# Patient Record
Sex: MALE | Race: WHITE | NOT HISPANIC OR LATINO | Employment: OTHER | URBAN - NONMETROPOLITAN AREA
[De-identification: names, ages, dates, MRNs, and addresses within clinical notes are randomized per-mention and may not be internally consistent; named-entity substitution may affect disease eponyms.]

---

## 2019-11-26 ENCOUNTER — HOSPITAL ENCOUNTER (INPATIENT)
Facility: HOSPITAL | Age: 64
LOS: 5 days | Discharge: HOME | End: 2019-12-01
Attending: INTERNAL MEDICINE | Admitting: FAMILY MEDICINE

## 2019-11-26 DIAGNOSIS — R65.21 SHOCK, SEPTIC (HCC): Primary | ICD-10-CM

## 2019-11-26 DIAGNOSIS — A41.9 SHOCK, SEPTIC (HCC): Primary | ICD-10-CM

## 2019-11-26 PROBLEM — R65.20 SEVERE SEPSIS (HCC): Status: ACTIVE | Noted: 2019-11-26

## 2019-11-26 LAB
ALBUMIN SERPL-MCNC: 2.8 G/DL (ref 3.5–5.2)
ALBUMIN/GLOB SERPL: 1.2 G/DL
ALP SERPL-CCNC: 33 U/L (ref 39–117)
ALT SERPL W P-5'-P-CCNC: <5 U/L (ref 1–41)
AMPHET+METHAMPHET UR QL: NEGATIVE
AMPHETAMINES UR QL: NEGATIVE
ANION GAP SERPL CALCULATED.3IONS-SCNC: 10 MMOL/L (ref 5–15)
APTT PPP: 38.1 SECONDS (ref 24.1–35)
ARTERIAL PATENCY WRIST A: POSITIVE
AST SERPL-CCNC: 18 U/L (ref 1–40)
ATMOSPHERIC PRESS: 741 MMHG
BARBITURATES UR QL SCN: NEGATIVE
BASE EXCESS BLDA CALC-SCNC: -5.7 MMOL/L (ref 0–2)
BDY SITE: ABNORMAL
BENZODIAZ UR QL SCN: NEGATIVE
BILIRUB SERPL-MCNC: 0.2 MG/DL (ref 0.2–1.2)
BODY TEMPERATURE: 37 C
BUN BLD-MCNC: 41 MG/DL (ref 8–23)
BUN/CREAT SERPL: 19.3 (ref 7–25)
BUPRENORPHINE SERPL-MCNC: NEGATIVE NG/ML
CALCIUM SPEC-SCNC: 7.2 MG/DL (ref 8.6–10.5)
CANNABINOIDS SERPL QL: NEGATIVE
CHLORIDE SERPL-SCNC: 105 MMOL/L (ref 98–107)
CO2 SERPL-SCNC: 20 MMOL/L (ref 22–29)
COCAINE UR QL: NEGATIVE
CREAT BLD-MCNC: 2.12 MG/DL (ref 0.76–1.27)
D-LACTATE SERPL-SCNC: 1.4 MMOL/L (ref 0.5–2)
FLUAV AG NPH QL: NEGATIVE
FLUBV AG NPH QL IA: NEGATIVE
GAS FLOW AIRWAY: 3 LPM
GFR SERPL CREATININE-BSD FRML MDRD: 32 ML/MIN/1.73
GLOBULIN UR ELPH-MCNC: 2.3 GM/DL
GLUCOSE BLD-MCNC: 139 MG/DL (ref 65–99)
HCO3 BLDA-SCNC: 18.1 MMOL/L (ref 20–26)
INR PPP: 1.2 (ref 0.91–1.09)
Lab: ABNORMAL
MAGNESIUM SERPL-MCNC: 1.8 MG/DL (ref 1.6–2.4)
METHADONE UR QL SCN: NEGATIVE
MODALITY: ABNORMAL
OPIATES UR QL: NEGATIVE
OXYCODONE UR QL SCN: NEGATIVE
PCO2 BLDA: 29.7 MM HG (ref 35–45)
PCP UR QL SCN: NEGATIVE
PH BLDA: 7.39 PH UNITS (ref 7.35–7.45)
PO2 BLDA: 70.4 MM HG (ref 83–108)
POTASSIUM BLD-SCNC: 4.1 MMOL/L (ref 3.5–5.2)
PROPOXYPH UR QL: NEGATIVE
PROT SERPL-MCNC: 5.1 G/DL (ref 6–8.5)
PROTHROMBIN TIME: 15.6 SECONDS (ref 11.9–14.6)
SAO2 % BLDCOA: 95 % (ref 94–99)
SODIUM BLD-SCNC: 135 MMOL/L (ref 136–145)
SODIUM UR-SCNC: <20 MMOL/L
TRICYCLICS UR QL SCN: NEGATIVE
TSH SERPL DL<=0.05 MIU/L-ACNC: 0.3 UIU/ML (ref 0.27–4.2)
VENTILATOR MODE: ABNORMAL

## 2019-11-26 PROCEDURE — 87040 BLOOD CULTURE FOR BACTERIA: CPT | Performed by: INTERNAL MEDICINE

## 2019-11-26 PROCEDURE — 87804 INFLUENZA ASSAY W/OPTIC: CPT | Performed by: INTERNAL MEDICINE

## 2019-11-26 PROCEDURE — 36600 WITHDRAWAL OF ARTERIAL BLOOD: CPT

## 2019-11-26 PROCEDURE — 93005 ELECTROCARDIOGRAM TRACING: CPT | Performed by: INTERNAL MEDICINE

## 2019-11-26 PROCEDURE — 87798 DETECT AGENT NOS DNA AMP: CPT | Performed by: INTERNAL MEDICINE

## 2019-11-26 PROCEDURE — 80053 COMPREHEN METABOLIC PANEL: CPT | Performed by: INTERNAL MEDICINE

## 2019-11-26 PROCEDURE — 83735 ASSAY OF MAGNESIUM: CPT | Performed by: INTERNAL MEDICINE

## 2019-11-26 PROCEDURE — 85025 COMPLETE CBC W/AUTO DIFF WBC: CPT | Performed by: INTERNAL MEDICINE

## 2019-11-26 PROCEDURE — 84443 ASSAY THYROID STIM HORMONE: CPT | Performed by: INTERNAL MEDICINE

## 2019-11-26 PROCEDURE — 25010000002 MAGNESIUM SULFATE 2 GM/50ML SOLUTION: Performed by: INTERNAL MEDICINE

## 2019-11-26 PROCEDURE — 83605 ASSAY OF LACTIC ACID: CPT | Performed by: INTERNAL MEDICINE

## 2019-11-26 PROCEDURE — 80307 DRUG TEST PRSMV CHEM ANLYZR: CPT | Performed by: INTERNAL MEDICINE

## 2019-11-26 PROCEDURE — 82570 ASSAY OF URINE CREATININE: CPT | Performed by: INTERNAL MEDICINE

## 2019-11-26 PROCEDURE — 94799 UNLISTED PULMONARY SVC/PX: CPT

## 2019-11-26 PROCEDURE — 82803 BLOOD GASES ANY COMBINATION: CPT

## 2019-11-26 PROCEDURE — 25010000002 CEFTRIAXONE PER 250 MG: Performed by: INTERNAL MEDICINE

## 2019-11-26 PROCEDURE — 85730 THROMBOPLASTIN TIME PARTIAL: CPT | Performed by: INTERNAL MEDICINE

## 2019-11-26 PROCEDURE — 84145 PROCALCITONIN (PCT): CPT | Performed by: INTERNAL MEDICINE

## 2019-11-26 PROCEDURE — 93010 ELECTROCARDIOGRAM REPORT: CPT | Performed by: INTERNAL MEDICINE

## 2019-11-26 PROCEDURE — 84300 ASSAY OF URINE SODIUM: CPT | Performed by: INTERNAL MEDICINE

## 2019-11-26 PROCEDURE — 84540 ASSAY OF URINE/UREA-N: CPT | Performed by: INTERNAL MEDICINE

## 2019-11-26 PROCEDURE — 85007 BL SMEAR W/DIFF WBC COUNT: CPT | Performed by: INTERNAL MEDICINE

## 2019-11-26 PROCEDURE — 85610 PROTHROMBIN TIME: CPT | Performed by: INTERNAL MEDICINE

## 2019-11-26 RX ORDER — MAGNESIUM SULFATE HEPTAHYDRATE 40 MG/ML
2 INJECTION, SOLUTION INTRAVENOUS ONCE
Status: COMPLETED | OUTPATIENT
Start: 2019-11-26 | End: 2019-11-26

## 2019-11-26 RX ORDER — SODIUM CHLORIDE, SODIUM LACTATE, POTASSIUM CHLORIDE, CALCIUM CHLORIDE 600; 310; 30; 20 MG/100ML; MG/100ML; MG/100ML; MG/100ML
50 INJECTION, SOLUTION INTRAVENOUS CONTINUOUS
Status: DISCONTINUED | OUTPATIENT
Start: 2019-11-26 | End: 2019-11-30

## 2019-11-26 RX ORDER — SODIUM CHLORIDE 0.9 % (FLUSH) 0.9 %
10 SYRINGE (ML) INJECTION EVERY 12 HOURS SCHEDULED
Status: DISCONTINUED | OUTPATIENT
Start: 2019-11-26 | End: 2019-12-01 | Stop reason: HOSPADM

## 2019-11-26 RX ORDER — HEPARIN SODIUM 5000 [USP'U]/ML
5000 INJECTION, SOLUTION INTRAVENOUS; SUBCUTANEOUS EVERY 8 HOURS SCHEDULED
Status: DISCONTINUED | OUTPATIENT
Start: 2019-11-26 | End: 2019-11-29

## 2019-11-26 RX ORDER — SODIUM CHLORIDE 0.9 % (FLUSH) 0.9 %
10 SYRINGE (ML) INJECTION AS NEEDED
Status: DISCONTINUED | OUTPATIENT
Start: 2019-11-26 | End: 2019-12-01 | Stop reason: HOSPADM

## 2019-11-26 RX ADMIN — SODIUM CHLORIDE, POTASSIUM CHLORIDE, SODIUM LACTATE AND CALCIUM CHLORIDE 100 ML/HR: 600; 310; 30; 20 INJECTION, SOLUTION INTRAVENOUS at 22:42

## 2019-11-26 RX ADMIN — DOXYCYCLINE 100 MG: 100 INJECTION, POWDER, LYOPHILIZED, FOR SOLUTION INTRAVENOUS at 22:59

## 2019-11-26 RX ADMIN — SODIUM CHLORIDE, PRESERVATIVE FREE 10 ML: 5 INJECTION INTRAVENOUS at 22:43

## 2019-11-26 RX ADMIN — CEFTRIAXONE SODIUM 2 G: 2 INJECTION, POWDER, FOR SOLUTION INTRAMUSCULAR; INTRAVENOUS at 22:59

## 2019-11-26 RX ADMIN — MAGNESIUM SULFATE HEPTAHYDRATE 2 G: 40 INJECTION, SOLUTION INTRAVENOUS at 22:44

## 2019-11-27 ENCOUNTER — APPOINTMENT (OUTPATIENT)
Dept: CT IMAGING | Facility: HOSPITAL | Age: 64
End: 2019-11-27

## 2019-11-27 ENCOUNTER — APPOINTMENT (OUTPATIENT)
Dept: GENERAL RADIOLOGY | Facility: HOSPITAL | Age: 64
End: 2019-11-27

## 2019-11-27 ENCOUNTER — APPOINTMENT (OUTPATIENT)
Dept: CARDIOLOGY | Facility: HOSPITAL | Age: 64
End: 2019-11-27

## 2019-11-27 PROBLEM — D72.825 BANDEMIA: Status: ACTIVE | Noted: 2019-11-27

## 2019-11-27 PROBLEM — D72.829 LEUKOCYTOSIS: Status: ACTIVE | Noted: 2019-11-27

## 2019-11-27 PROBLEM — R65.21 SHOCK, SEPTIC (HCC): Status: ACTIVE | Noted: 2019-11-27

## 2019-11-27 PROBLEM — R50.9 FEVER: Status: ACTIVE | Noted: 2019-11-27

## 2019-11-27 PROBLEM — J96.01 ACUTE RESPIRATORY FAILURE WITH HYPOXIA: Status: ACTIVE | Noted: 2019-11-27

## 2019-11-27 PROBLEM — E87.20 METABOLIC ACIDOSIS: Status: ACTIVE | Noted: 2019-11-27

## 2019-11-27 PROBLEM — A41.9 SHOCK, SEPTIC (HCC): Status: ACTIVE | Noted: 2019-11-27

## 2019-11-27 PROBLEM — N17.9 AKI (ACUTE KIDNEY INJURY): Status: ACTIVE | Noted: 2019-11-27

## 2019-11-27 PROBLEM — I48.91 ATRIAL FIBRILLATION WITH RVR (HCC): Status: ACTIVE | Noted: 2019-11-27

## 2019-11-27 LAB
ADV 40+41 DNA STL QL NAA+NON-PROBE: NOT DETECTED
ALBUMIN SERPL-MCNC: 3.4 G/DL (ref 3.5–5.2)
ALBUMIN/GLOB SERPL: 1 G/DL
ALP SERPL-CCNC: 44 U/L (ref 39–117)
ALT SERPL W P-5'-P-CCNC: 6 U/L (ref 1–41)
ANION GAP SERPL CALCULATED.3IONS-SCNC: 10 MMOL/L (ref 5–15)
ARTERIAL PATENCY WRIST A: POSITIVE
AST SERPL-CCNC: 26 U/L (ref 1–40)
ASTRO TYP 1-8 RNA STL QL NAA+NON-PROBE: NOT DETECTED
ATMOSPHERIC PRESS: 740 MMHG
BACTERIA UR QL AUTO: ABNORMAL /HPF
BACTERIA UR QL AUTO: ABNORMAL /HPF
BASE EXCESS BLDA CALC-SCNC: -4.7 MMOL/L (ref 0–2)
BASOPHILS # BLD AUTO: 0.03 10*3/MM3 (ref 0–0.2)
BASOPHILS NFR BLD AUTO: 0.2 % (ref 0–1.5)
BDY SITE: ABNORMAL
BH CV ECHO MEAS - AO MAX PG: 6.3 MMHG
BH CV ECHO MEAS - AO MEAN PG: 3 MMHG
BH CV ECHO MEAS - AO ROOT AREA (BSA CORRECTED): 1.7
BH CV ECHO MEAS - AO ROOT AREA: 10.8 CM^2
BH CV ECHO MEAS - AO ROOT DIAM: 3.7 CM
BH CV ECHO MEAS - AO V2 MAX: 125 CM/SEC
BH CV ECHO MEAS - AO V2 MEAN: 78.2 CM/SEC
BH CV ECHO MEAS - AO V2 VTI: 17 CM
BH CV ECHO MEAS - BSA(HAYCOCK): 2.1 M^2
BH CV ECHO MEAS - BSA: 2.1 M^2
BH CV ECHO MEAS - BZI_BMI: 25.9 KILOGRAMS/M^2
BH CV ECHO MEAS - BZI_METRIC_HEIGHT: 185.4 CM
BH CV ECHO MEAS - BZI_METRIC_WEIGHT: 88.9 KG
BH CV ECHO MEAS - EDV(CUBED): 73.6 ML
BH CV ECHO MEAS - EDV(MOD-SP4): 49.4 ML
BH CV ECHO MEAS - EDV(TEICH): 78.1 ML
BH CV ECHO MEAS - EF(CUBED): 73.5 %
BH CV ECHO MEAS - EF(MOD-SP4): 63.6 %
BH CV ECHO MEAS - EF(TEICH): 65.7 %
BH CV ECHO MEAS - ESV(CUBED): 19.5 ML
BH CV ECHO MEAS - ESV(MOD-SP4): 18 ML
BH CV ECHO MEAS - ESV(TEICH): 26.8 ML
BH CV ECHO MEAS - FS: 35.8 %
BH CV ECHO MEAS - IVS/LVPW: 1.1
BH CV ECHO MEAS - IVSD: 0.87 CM
BH CV ECHO MEAS - LA DIMENSION: 3.5 CM
BH CV ECHO MEAS - LA/AO: 0.95
BH CV ECHO MEAS - LAT PEAK E' VEL: 15.8 CM/SEC
BH CV ECHO MEAS - LV DIASTOLIC VOL/BSA (35-75): 23.2 ML/M^2
BH CV ECHO MEAS - LV MASS(C)D: 108.5 GRAMS
BH CV ECHO MEAS - LV MASS(C)DI: 50.9 GRAMS/M^2
BH CV ECHO MEAS - LV SYSTOLIC VOL/BSA (12-30): 8.4 ML/M^2
BH CV ECHO MEAS - LVIDD: 4.2 CM
BH CV ECHO MEAS - LVIDS: 2.7 CM
BH CV ECHO MEAS - LVLD AP4: 7.4 CM
BH CV ECHO MEAS - LVLS AP4: 6.3 CM
BH CV ECHO MEAS - LVOT AREA (M): 4.2 CM^2
BH CV ECHO MEAS - LVOT AREA: 4.2 CM^2
BH CV ECHO MEAS - LVOT DIAM: 2.3 CM
BH CV ECHO MEAS - LVPWD: 0.82 CM
BH CV ECHO MEAS - MED PEAK E' VEL: 10.3 CM/SEC
BH CV ECHO MEAS - MV DEC TIME: 0.21 SEC
BH CV ECHO MEAS - MV E MAX VEL: 77.7 CM/SEC
BH CV ECHO MEAS - PI END-D VEL: 115 CM/SEC
BH CV ECHO MEAS - RAP SYSTOLE: 5 MMHG
BH CV ECHO MEAS - RVSP: 19.4 MMHG
BH CV ECHO MEAS - SI(AO): 85.9 ML/M^2
BH CV ECHO MEAS - SI(CUBED): 25.4 ML/M^2
BH CV ECHO MEAS - SI(MOD-SP4): 14.7 ML/M^2
BH CV ECHO MEAS - SI(TEICH): 24.1 ML/M^2
BH CV ECHO MEAS - SV(AO): 183.2 ML
BH CV ECHO MEAS - SV(CUBED): 54.1 ML
BH CV ECHO MEAS - SV(MOD-SP4): 31.4 ML
BH CV ECHO MEAS - SV(TEICH): 51.4 ML
BH CV ECHO MEAS - TR MAX VEL: 190 CM/SEC
BH CV ECHO MEASUREMENTS AVERAGE E/E' RATIO: 5.95
BILIRUB SERPL-MCNC: 0.2 MG/DL (ref 0.2–1.2)
BILIRUB UR QL STRIP: NEGATIVE
BILIRUB UR QL STRIP: NEGATIVE
BODY TEMPERATURE: 37 C
BUN BLD-MCNC: 38 MG/DL (ref 8–23)
BUN/CREAT SERPL: 20.2 (ref 7–25)
C CAYETANENSIS DNA STL QL NAA+NON-PROBE: NOT DETECTED
C DIFF TOX GENS STL QL NAA+PROBE: NOT DETECTED
CALCIUM SPEC-SCNC: 8.1 MG/DL (ref 8.6–10.5)
CAMPY SP DNA.DIARRHEA STL QL NAA+PROBE: NOT DETECTED
CHLORIDE SERPL-SCNC: 105 MMOL/L (ref 98–107)
CLARITY UR: ABNORMAL
CLARITY UR: CLEAR
CLUMPED PLATELETS: PRESENT
CO2 SERPL-SCNC: 23 MMOL/L (ref 22–29)
COARSE GRAN CASTS URNS QL MICRO: ABNORMAL /LPF
COLOR UR: ABNORMAL
COLOR UR: YELLOW
CREAT BLD-MCNC: 1.88 MG/DL (ref 0.76–1.27)
CREAT UR-MCNC: 188 MG/DL
CRYPTOSP STL CULT: NOT DETECTED
DEPRECATED RDW RBC AUTO: 43.8 FL (ref 37–54)
DEPRECATED RDW RBC AUTO: 45.3 FL (ref 37–54)
E COLI DNA SPEC QL NAA+PROBE: NOT DETECTED
E HISTOLYT AG STL-ACNC: NOT DETECTED
EAEC PAA PLAS AGGR+AATA ST NAA+NON-PRB: NOT DETECTED
EC STX1 + STX2 GENES STL NAA+PROBE: NOT DETECTED
EOSINOPHIL # BLD AUTO: 0.04 10*3/MM3 (ref 0–0.4)
EOSINOPHIL NFR BLD AUTO: 0.3 % (ref 0.3–6.2)
EPEC EAE GENE STL QL NAA+NON-PROBE: NOT DETECTED
ERYTHROCYTE [DISTWIDTH] IN BLOOD BY AUTOMATED COUNT: 13.3 % (ref 12.3–15.4)
ERYTHROCYTE [DISTWIDTH] IN BLOOD BY AUTOMATED COUNT: 13.4 % (ref 12.3–15.4)
ETEC LTA+ST1A+ST1B TOX ST NAA+NON-PROBE: NOT DETECTED
G LAMBLIA DNA SPEC QL NAA+PROBE: NOT DETECTED
GAS FLOW AIRWAY: 6 LPM
GFR SERPL CREATININE-BSD FRML MDRD: 36 ML/MIN/1.73
GIANT PLATELETS: ABNORMAL
GLOBULIN UR ELPH-MCNC: 3.4 GM/DL
GLUCOSE BLD-MCNC: 143 MG/DL (ref 65–99)
GLUCOSE UR STRIP-MCNC: NEGATIVE MG/DL
GLUCOSE UR STRIP-MCNC: NEGATIVE MG/DL
HCO3 BLDA-SCNC: 19.4 MMOL/L (ref 20–26)
HCT VFR BLD AUTO: 37.4 % (ref 37.5–51)
HCT VFR BLD AUTO: 43.3 % (ref 37.5–51)
HGB BLD-MCNC: 12.7 G/DL (ref 13–17.7)
HGB BLD-MCNC: 14.3 G/DL (ref 13–17.7)
HGB UR QL STRIP.AUTO: ABNORMAL
HGB UR QL STRIP.AUTO: ABNORMAL
HYALINE CASTS UR QL AUTO: ABNORMAL /LPF
HYALINE CASTS UR QL AUTO: ABNORMAL /LPF
KETONES UR QL STRIP: NEGATIVE
KETONES UR QL STRIP: NEGATIVE
LEFT ATRIUM VOLUME INDEX: 25.2 ML/M2
LEFT ATRIUM VOLUME: 53.6 CM3
LEUKOCYTE ESTERASE UR QL STRIP.AUTO: ABNORMAL
LEUKOCYTE ESTERASE UR QL STRIP.AUTO: NEGATIVE
LV EF 2D ECHO EST: 65 %
LYMPHOCYTES # BLD AUTO: 0.42 10*3/MM3 (ref 0.7–3.1)
LYMPHOCYTES # BLD MANUAL: 0.14 10*3/MM3 (ref 0.7–3.1)
LYMPHOCYTES NFR BLD AUTO: 3.1 % (ref 19.6–45.3)
LYMPHOCYTES NFR BLD MANUAL: 1 % (ref 19.6–45.3)
LYMPHOCYTES NFR BLD MANUAL: 2 % (ref 5–12)
Lab: ABNORMAL
MAXIMAL PREDICTED HEART RATE: 157 BPM
MCH RBC QN AUTO: 30 PG (ref 26.6–33)
MCH RBC QN AUTO: 30.3 PG (ref 26.6–33)
MCHC RBC AUTO-ENTMCNC: 33 G/DL (ref 31.5–35.7)
MCHC RBC AUTO-ENTMCNC: 34 G/DL (ref 31.5–35.7)
MCV RBC AUTO: 89.3 FL (ref 79–97)
MCV RBC AUTO: 91 FL (ref 79–97)
METAMYELOCYTES NFR BLD MANUAL: 1 % (ref 0–0)
MODALITY: ABNORMAL
MONOCYTES # BLD AUTO: 0.27 10*3/MM3 (ref 0.1–0.9)
MONOCYTES # BLD AUTO: 0.3 10*3/MM3 (ref 0.1–0.9)
MONOCYTES NFR BLD AUTO: 2.2 % (ref 5–12)
NEUTROPHILS # BLD AUTO: 12.66 10*3/MM3 (ref 1.7–7)
NEUTROPHILS # BLD AUTO: 13.19 10*3/MM3 (ref 1.7–7)
NEUTROPHILS NFR BLD AUTO: 93 % (ref 42.7–76)
NEUTROPHILS NFR BLD MANUAL: 74 % (ref 42.7–76)
NEUTS BAND NFR BLD MANUAL: 22 % (ref 0–5)
NITRITE UR QL STRIP: NEGATIVE
NITRITE UR QL STRIP: NEGATIVE
NOROVIRUS GI+II RNA STL QL NAA+NON-PROBE: NOT DETECTED
P SHIGELLOIDES DNA STL QL NAA+PROBE: NOT DETECTED
PCO2 BLDA: 32.6 MM HG (ref 35–45)
PH BLDA: 7.38 PH UNITS (ref 7.35–7.45)
PH UR STRIP.AUTO: <=5 [PH] (ref 5–8)
PH UR STRIP.AUTO: <=5 [PH] (ref 5–8)
PLATELET # BLD AUTO: 164 10*3/MM3 (ref 140–450)
PLATELET # BLD AUTO: 174 10*3/MM3 (ref 140–450)
PMV BLD AUTO: 10.7 FL (ref 6–12)
PMV BLD AUTO: 10.8 FL (ref 6–12)
PO2 BLDA: 80.3 MM HG (ref 83–108)
POTASSIUM BLD-SCNC: 5.2 MMOL/L (ref 3.5–5.2)
PROCALCITONIN SERPL-MCNC: 26.59 NG/ML (ref 0.1–0.25)
PROT SERPL-MCNC: 6.8 G/DL (ref 6–8.5)
PROT UR QL STRIP: ABNORMAL
PROT UR QL STRIP: ABNORMAL
RBC # BLD AUTO: 4.19 10*6/MM3 (ref 4.14–5.8)
RBC # BLD AUTO: 4.76 10*6/MM3 (ref 4.14–5.8)
RBC # UR: ABNORMAL /HPF
RBC # UR: ABNORMAL /HPF
RBC MORPH BLD: NORMAL
REF LAB TEST METHOD: ABNORMAL
REF LAB TEST METHOD: ABNORMAL
RV RNA STL NAA+PROBE: NOT DETECTED
SALMONELLA DNA SPEC QL NAA+PROBE: NOT DETECTED
SAO2 % BLDCOA: 96.4 % (ref 94–99)
SAPO I+II+IV+V RNA STL QL NAA+NON-PROBE: NOT DETECTED
SHIGELLA SP+EIEC IPAH STL QL NAA+PROBE: NOT DETECTED
SODIUM BLD-SCNC: 138 MMOL/L (ref 136–145)
SP GR UR STRIP: 1.01 (ref 1–1.03)
SP GR UR STRIP: 1.02 (ref 1–1.03)
SQUAMOUS #/AREA URNS HPF: ABNORMAL /HPF
SQUAMOUS #/AREA URNS HPF: ABNORMAL /HPF
STRESS TARGET HR: 133 BPM
TRANS CELLS #/AREA URNS HPF: ABNORMAL /HPF
UROBILINOGEN UR QL STRIP: ABNORMAL
UROBILINOGEN UR QL STRIP: ABNORMAL
UUN 24H UR-MCNC: 1137 MG/DL
V CHOLERAE DNA SPEC QL NAA+PROBE: NOT DETECTED
VENTILATOR MODE: ABNORMAL
VIBRIO DNA SPEC NAA+PROBE: NOT DETECTED
WBC CLUMPS # UR AUTO: ABNORMAL /HPF
WBC MORPH BLD: NORMAL
WBC NRBC COR # BLD: 13.61 10*3/MM3 (ref 3.4–10.8)
WBC NRBC COR # BLD: 13.74 10*3/MM3 (ref 3.4–10.8)
WBC UR QL AUTO: ABNORMAL /HPF
WBC UR QL AUTO: ABNORMAL /HPF
YERSINIA STL CULT: NOT DETECTED

## 2019-11-27 PROCEDURE — 94799 UNLISTED PULMONARY SVC/PX: CPT

## 2019-11-27 PROCEDURE — 85025 COMPLETE CBC W/AUTO DIFF WBC: CPT | Performed by: INTERNAL MEDICINE

## 2019-11-27 PROCEDURE — 81001 URINALYSIS AUTO W/SCOPE: CPT | Performed by: INTERNAL MEDICINE

## 2019-11-27 PROCEDURE — 71045 X-RAY EXAM CHEST 1 VIEW: CPT

## 2019-11-27 PROCEDURE — 36600 WITHDRAWAL OF ARTERIAL BLOOD: CPT

## 2019-11-27 PROCEDURE — 0097U HC BIOFIRE FILMARRAY GI PANEL: CPT | Performed by: INTERNAL MEDICINE

## 2019-11-27 PROCEDURE — 93306 TTE W/DOPPLER COMPLETE: CPT

## 2019-11-27 PROCEDURE — 86757 RICKETTSIA ANTIBODY: CPT | Performed by: INTERNAL MEDICINE

## 2019-11-27 PROCEDURE — 25010000002 CEFTRIAXONE PER 250 MG: Performed by: INTERNAL MEDICINE

## 2019-11-27 PROCEDURE — 71250 CT THORAX DX C-: CPT

## 2019-11-27 PROCEDURE — 05HM33Z INSERTION OF INFUSION DEVICE INTO RIGHT INTERNAL JUGULAR VEIN, PERCUTANEOUS APPROACH: ICD-10-PCS | Performed by: INTERNAL MEDICINE

## 2019-11-27 PROCEDURE — 74176 CT ABD & PELVIS W/O CONTRAST: CPT

## 2019-11-27 PROCEDURE — 93306 TTE W/DOPPLER COMPLETE: CPT | Performed by: INTERNAL MEDICINE

## 2019-11-27 PROCEDURE — 82803 BLOOD GASES ANY COMBINATION: CPT

## 2019-11-27 PROCEDURE — 25010000002 HEPARIN (PORCINE) PER 1000 UNITS: Performed by: INTERNAL MEDICINE

## 2019-11-27 PROCEDURE — 87086 URINE CULTURE/COLONY COUNT: CPT | Performed by: INTERNAL MEDICINE

## 2019-11-27 PROCEDURE — 80053 COMPREHEN METABOLIC PANEL: CPT | Performed by: INTERNAL MEDICINE

## 2019-11-27 RX ORDER — ACETAMINOPHEN 325 MG/1
650 TABLET ORAL EVERY 6 HOURS PRN
Status: DISCONTINUED | OUTPATIENT
Start: 2019-11-27 | End: 2019-12-01 | Stop reason: HOSPADM

## 2019-11-27 RX ORDER — AZITHROMYCIN 250 MG/1
250 TABLET, FILM COATED ORAL DAILY
COMMUNITY
Start: 2019-11-26 | End: 2019-12-01 | Stop reason: HOSPADM

## 2019-11-27 RX ADMIN — HEPARIN SODIUM 5000 UNITS: 5000 INJECTION, SOLUTION INTRAVENOUS; SUBCUTANEOUS at 00:15

## 2019-11-27 RX ADMIN — DOXYCYCLINE 100 MG: 100 INJECTION, POWDER, LYOPHILIZED, FOR SOLUTION INTRAVENOUS at 11:10

## 2019-11-27 RX ADMIN — SODIUM CHLORIDE, POTASSIUM CHLORIDE, SODIUM LACTATE AND CALCIUM CHLORIDE 100 ML/HR: 600; 310; 30; 20 INJECTION, SOLUTION INTRAVENOUS at 13:46

## 2019-11-27 RX ADMIN — SODIUM CHLORIDE, PRESERVATIVE FREE 10 ML: 5 INJECTION INTRAVENOUS at 20:07

## 2019-11-27 RX ADMIN — CEFTRIAXONE SODIUM 2 G: 2 INJECTION, POWDER, FOR SOLUTION INTRAMUSCULAR; INTRAVENOUS at 22:34

## 2019-11-27 RX ADMIN — HEPARIN SODIUM 5000 UNITS: 5000 INJECTION, SOLUTION INTRAVENOUS; SUBCUTANEOUS at 13:45

## 2019-11-27 RX ADMIN — SODIUM CHLORIDE 500 ML: 9 INJECTION, SOLUTION INTRAVENOUS at 23:32

## 2019-11-27 RX ADMIN — DOXYCYCLINE 100 MG: 100 INJECTION, POWDER, LYOPHILIZED, FOR SOLUTION INTRAVENOUS at 22:34

## 2019-11-27 RX ADMIN — HEPARIN SODIUM 5000 UNITS: 5000 INJECTION, SOLUTION INTRAVENOUS; SUBCUTANEOUS at 06:03

## 2019-11-27 RX ADMIN — HEPARIN SODIUM 5000 UNITS: 5000 INJECTION, SOLUTION INTRAVENOUS; SUBCUTANEOUS at 21:34

## 2019-11-28 LAB
ANION GAP SERPL CALCULATED.3IONS-SCNC: 6 MMOL/L (ref 5–15)
BACTERIA SPEC AEROBE CULT: NORMAL
BASOPHILS # BLD AUTO: 0.02 10*3/MM3 (ref 0–0.2)
BASOPHILS NFR BLD AUTO: 0.2 % (ref 0–1.5)
BUN BLD-MCNC: 23 MG/DL (ref 8–23)
BUN/CREAT SERPL: 22.8 (ref 7–25)
CALCIUM SPEC-SCNC: 8.3 MG/DL (ref 8.6–10.5)
CHLORIDE SERPL-SCNC: 107 MMOL/L (ref 98–107)
CO2 SERPL-SCNC: 26 MMOL/L (ref 22–29)
CREAT BLD-MCNC: 1.01 MG/DL (ref 0.76–1.27)
DEPRECATED RDW RBC AUTO: 44.4 FL (ref 37–54)
EOSINOPHIL # BLD AUTO: 0.38 10*3/MM3 (ref 0–0.4)
EOSINOPHIL NFR BLD AUTO: 3.1 % (ref 0.3–6.2)
ERYTHROCYTE [DISTWIDTH] IN BLOOD BY AUTOMATED COUNT: 13.5 % (ref 12.3–15.4)
GFR SERPL CREATININE-BSD FRML MDRD: 75 ML/MIN/1.73
GLUCOSE BLD-MCNC: 140 MG/DL (ref 65–99)
HBA1C MFR BLD: 5.7 % (ref 4.8–5.6)
HCT VFR BLD AUTO: 40.3 % (ref 37.5–51)
HGB BLD-MCNC: 13.7 G/DL (ref 13–17.7)
IMM GRANULOCYTES # BLD AUTO: 0.06 10*3/MM3 (ref 0–0.05)
IMM GRANULOCYTES NFR BLD AUTO: 0.5 % (ref 0–0.5)
LYMPHOCYTES # BLD AUTO: 0.85 10*3/MM3 (ref 0.7–3.1)
LYMPHOCYTES NFR BLD AUTO: 6.9 % (ref 19.6–45.3)
MCH RBC QN AUTO: 30.5 PG (ref 26.6–33)
MCHC RBC AUTO-ENTMCNC: 34 G/DL (ref 31.5–35.7)
MCV RBC AUTO: 89.8 FL (ref 79–97)
MONOCYTES # BLD AUTO: 0.81 10*3/MM3 (ref 0.1–0.9)
MONOCYTES NFR BLD AUTO: 6.6 % (ref 5–12)
NEUTROPHILS # BLD AUTO: 10.24 10*3/MM3 (ref 1.7–7)
NEUTROPHILS NFR BLD AUTO: 82.7 % (ref 42.7–76)
NRBC BLD AUTO-RTO: 0 /100 WBC (ref 0–0.2)
NT-PROBNP SERPL-MCNC: 3242 PG/ML (ref 5–900)
PLATELET # BLD AUTO: 165 10*3/MM3 (ref 140–450)
PMV BLD AUTO: 10.9 FL (ref 6–12)
POTASSIUM BLD-SCNC: 4.5 MMOL/L (ref 3.5–5.2)
RBC # BLD AUTO: 4.49 10*6/MM3 (ref 4.14–5.8)
SODIUM BLD-SCNC: 139 MMOL/L (ref 136–145)
WBC NRBC COR # BLD: 12.36 10*3/MM3 (ref 3.4–10.8)

## 2019-11-28 PROCEDURE — 94799 UNLISTED PULMONARY SVC/PX: CPT

## 2019-11-28 PROCEDURE — 83880 ASSAY OF NATRIURETIC PEPTIDE: CPT | Performed by: INTERNAL MEDICINE

## 2019-11-28 PROCEDURE — 85025 COMPLETE CBC W/AUTO DIFF WBC: CPT | Performed by: FAMILY MEDICINE

## 2019-11-28 PROCEDURE — 83036 HEMOGLOBIN GLYCOSYLATED A1C: CPT | Performed by: INTERNAL MEDICINE

## 2019-11-28 PROCEDURE — 25010000002 DIGOXIN PER 500 MCG: Performed by: INTERNAL MEDICINE

## 2019-11-28 PROCEDURE — 99254 IP/OBS CNSLTJ NEW/EST MOD 60: CPT | Performed by: INTERNAL MEDICINE

## 2019-11-28 PROCEDURE — 25010000002 HEPARIN (PORCINE) PER 1000 UNITS: Performed by: INTERNAL MEDICINE

## 2019-11-28 PROCEDURE — 80048 BASIC METABOLIC PNL TOTAL CA: CPT | Performed by: FAMILY MEDICINE

## 2019-11-28 PROCEDURE — 25010000002 CEFTRIAXONE PER 250 MG: Performed by: INTERNAL MEDICINE

## 2019-11-28 RX ORDER — DIGOXIN 0.25 MG/ML
500 INJECTION INTRAMUSCULAR; INTRAVENOUS ONCE
Status: COMPLETED | OUTPATIENT
Start: 2019-11-28 | End: 2019-11-28

## 2019-11-28 RX ORDER — CALCIUM CARBONATE 200(500)MG
1 TABLET,CHEWABLE ORAL 3 TIMES DAILY PRN
Status: DISCONTINUED | OUTPATIENT
Start: 2019-11-28 | End: 2019-12-01 | Stop reason: HOSPADM

## 2019-11-28 RX ADMIN — HEPARIN SODIUM 5000 UNITS: 5000 INJECTION, SOLUTION INTRAVENOUS; SUBCUTANEOUS at 21:19

## 2019-11-28 RX ADMIN — SODIUM CHLORIDE, PRESERVATIVE FREE 10 ML: 5 INJECTION INTRAVENOUS at 08:14

## 2019-11-28 RX ADMIN — DOXYCYCLINE 100 MG: 100 INJECTION, POWDER, LYOPHILIZED, FOR SOLUTION INTRAVENOUS at 11:21

## 2019-11-28 RX ADMIN — CEFTRIAXONE SODIUM 2 G: 2 INJECTION, POWDER, FOR SOLUTION INTRAMUSCULAR; INTRAVENOUS at 22:34

## 2019-11-28 RX ADMIN — DIGOXIN 500 MCG: 0.25 INJECTION INTRAMUSCULAR; INTRAVENOUS at 04:45

## 2019-11-28 RX ADMIN — HEPARIN SODIUM 5000 UNITS: 5000 INJECTION, SOLUTION INTRAVENOUS; SUBCUTANEOUS at 06:02

## 2019-11-28 RX ADMIN — SODIUM CHLORIDE, POTASSIUM CHLORIDE, SODIUM LACTATE AND CALCIUM CHLORIDE 75 ML/HR: 600; 310; 30; 20 INJECTION, SOLUTION INTRAVENOUS at 21:11

## 2019-11-28 RX ADMIN — CALCIUM CARBONATE 1 TABLET: 500 TABLET, CHEWABLE ORAL at 09:25

## 2019-11-28 RX ADMIN — SODIUM CHLORIDE, POTASSIUM CHLORIDE, SODIUM LACTATE AND CALCIUM CHLORIDE 125 ML/HR: 600; 310; 30; 20 INJECTION, SOLUTION INTRAVENOUS at 08:33

## 2019-11-28 RX ADMIN — DOXYCYCLINE 100 MG: 100 INJECTION, POWDER, LYOPHILIZED, FOR SOLUTION INTRAVENOUS at 23:05

## 2019-11-29 LAB
ANION GAP SERPL CALCULATED.3IONS-SCNC: 10 MMOL/L (ref 5–15)
BASOPHILS # BLD AUTO: 0.02 10*3/MM3 (ref 0–0.2)
BASOPHILS NFR BLD AUTO: 0.3 % (ref 0–1.5)
BUN BLD-MCNC: 21 MG/DL (ref 8–23)
BUN/CREAT SERPL: 23.1 (ref 7–25)
CALCIUM SPEC-SCNC: 9.4 MG/DL (ref 8.6–10.5)
CHLORIDE SERPL-SCNC: 105 MMOL/L (ref 98–107)
CO2 SERPL-SCNC: 27 MMOL/L (ref 22–29)
CREAT BLD-MCNC: 0.91 MG/DL (ref 0.76–1.27)
DEPRECATED RDW RBC AUTO: 41.8 FL (ref 37–54)
EOSINOPHIL # BLD AUTO: 0.31 10*3/MM3 (ref 0–0.4)
EOSINOPHIL NFR BLD AUTO: 5.2 % (ref 0.3–6.2)
ERYTHROCYTE [DISTWIDTH] IN BLOOD BY AUTOMATED COUNT: 13.1 % (ref 12.3–15.4)
GFR SERPL CREATININE-BSD FRML MDRD: 84 ML/MIN/1.73
GLUCOSE BLD-MCNC: 94 MG/DL (ref 65–99)
HCT VFR BLD AUTO: 43.8 % (ref 37.5–51)
HGB BLD-MCNC: 14.9 G/DL (ref 13–17.7)
IMM GRANULOCYTES # BLD AUTO: 0.03 10*3/MM3 (ref 0–0.05)
IMM GRANULOCYTES NFR BLD AUTO: 0.5 % (ref 0–0.5)
LYMPHOCYTES # BLD AUTO: 1.22 10*3/MM3 (ref 0.7–3.1)
LYMPHOCYTES NFR BLD AUTO: 20.4 % (ref 19.6–45.3)
MCH RBC QN AUTO: 29.6 PG (ref 26.6–33)
MCHC RBC AUTO-ENTMCNC: 34 G/DL (ref 31.5–35.7)
MCV RBC AUTO: 87.1 FL (ref 79–97)
MONOCYTES # BLD AUTO: 0.66 10*3/MM3 (ref 0.1–0.9)
MONOCYTES NFR BLD AUTO: 11.1 % (ref 5–12)
NEUTROPHILS # BLD AUTO: 3.73 10*3/MM3 (ref 1.7–7)
NEUTROPHILS NFR BLD AUTO: 62.5 % (ref 42.7–76)
NRBC BLD AUTO-RTO: 0 /100 WBC (ref 0–0.2)
PLATELET # BLD AUTO: 167 10*3/MM3 (ref 140–450)
PMV BLD AUTO: 11.3 FL (ref 6–12)
POTASSIUM BLD-SCNC: 4 MMOL/L (ref 3.5–5.2)
RBC # BLD AUTO: 5.03 10*6/MM3 (ref 4.14–5.8)
SODIUM BLD-SCNC: 142 MMOL/L (ref 136–145)
WBC NRBC COR # BLD: 5.97 10*3/MM3 (ref 3.4–10.8)

## 2019-11-29 PROCEDURE — 87070 CULTURE OTHR SPECIMN AEROBIC: CPT | Performed by: INTERNAL MEDICINE

## 2019-11-29 PROCEDURE — 94760 N-INVAS EAR/PLS OXIMETRY 1: CPT

## 2019-11-29 PROCEDURE — 93005 ELECTROCARDIOGRAM TRACING: CPT | Performed by: INTERNAL MEDICINE

## 2019-11-29 PROCEDURE — 94799 UNLISTED PULMONARY SVC/PX: CPT

## 2019-11-29 PROCEDURE — 87205 SMEAR GRAM STAIN: CPT | Performed by: INTERNAL MEDICINE

## 2019-11-29 PROCEDURE — 93010 ELECTROCARDIOGRAM REPORT: CPT | Performed by: INTERNAL MEDICINE

## 2019-11-29 PROCEDURE — 80048 BASIC METABOLIC PNL TOTAL CA: CPT | Performed by: FAMILY MEDICINE

## 2019-11-29 PROCEDURE — 99232 SBSQ HOSP IP/OBS MODERATE 35: CPT | Performed by: INTERNAL MEDICINE

## 2019-11-29 PROCEDURE — 25010000002 HEPARIN (PORCINE) PER 1000 UNITS: Performed by: INTERNAL MEDICINE

## 2019-11-29 PROCEDURE — 25010000002 CEFTRIAXONE PER 250 MG: Performed by: INTERNAL MEDICINE

## 2019-11-29 PROCEDURE — 85025 COMPLETE CBC W/AUTO DIFF WBC: CPT | Performed by: FAMILY MEDICINE

## 2019-11-29 RX ORDER — CARVEDILOL 3.12 MG/1
3.12 TABLET ORAL 2 TIMES DAILY WITH MEALS
Status: DISCONTINUED | OUTPATIENT
Start: 2019-11-29 | End: 2019-11-30

## 2019-11-29 RX ADMIN — HEPARIN SODIUM 5000 UNITS: 5000 INJECTION, SOLUTION INTRAVENOUS; SUBCUTANEOUS at 06:27

## 2019-11-29 RX ADMIN — SODIUM CHLORIDE, POTASSIUM CHLORIDE, SODIUM LACTATE AND CALCIUM CHLORIDE 50 ML/HR: 600; 310; 30; 20 INJECTION, SOLUTION INTRAVENOUS at 23:51

## 2019-11-29 RX ADMIN — CEFTRIAXONE SODIUM 2 G: 2 INJECTION, POWDER, FOR SOLUTION INTRAMUSCULAR; INTRAVENOUS at 23:49

## 2019-11-29 RX ADMIN — CARVEDILOL 3.12 MG: 3.12 TABLET, FILM COATED ORAL at 17:08

## 2019-11-29 RX ADMIN — DOXYCYCLINE 100 MG: 100 INJECTION, POWDER, LYOPHILIZED, FOR SOLUTION INTRAVENOUS at 12:33

## 2019-11-29 RX ADMIN — SODIUM CHLORIDE, POTASSIUM CHLORIDE, SODIUM LACTATE AND CALCIUM CHLORIDE 75 ML/HR: 600; 310; 30; 20 INJECTION, SOLUTION INTRAVENOUS at 08:48

## 2019-11-29 RX ADMIN — APIXABAN 5 MG: 5 TABLET, FILM COATED ORAL at 09:15

## 2019-11-29 RX ADMIN — DOXYCYCLINE 100 MG: 100 INJECTION, POWDER, LYOPHILIZED, FOR SOLUTION INTRAVENOUS at 23:49

## 2019-11-29 RX ADMIN — APIXABAN 5 MG: 5 TABLET, FILM COATED ORAL at 21:46

## 2019-11-29 RX ADMIN — SODIUM CHLORIDE, PRESERVATIVE FREE 10 ML: 5 INJECTION INTRAVENOUS at 21:46

## 2019-11-29 RX ADMIN — CARVEDILOL 3.12 MG: 3.12 TABLET, FILM COATED ORAL at 09:15

## 2019-11-30 PROCEDURE — 99232 SBSQ HOSP IP/OBS MODERATE 35: CPT | Performed by: INTERNAL MEDICINE

## 2019-11-30 RX ORDER — DOXYCYCLINE 100 MG/1
100 TABLET ORAL EVERY 12 HOURS SCHEDULED
Status: DISCONTINUED | OUTPATIENT
Start: 2019-11-30 | End: 2019-12-01 | Stop reason: HOSPADM

## 2019-11-30 RX ORDER — CARVEDILOL 6.25 MG/1
6.25 TABLET ORAL 2 TIMES DAILY WITH MEALS
Qty: 60 TABLET | Refills: 0 | Status: CANCELLED | OUTPATIENT
Start: 2019-11-30

## 2019-11-30 RX ORDER — METOPROLOL TARTRATE 5 MG/5ML
5 INJECTION INTRAVENOUS ONCE
Status: COMPLETED | OUTPATIENT
Start: 2019-11-30 | End: 2019-11-30

## 2019-11-30 RX ORDER — CEFDINIR 300 MG/1
300 CAPSULE ORAL EVERY 12 HOURS SCHEDULED
Status: DISCONTINUED | OUTPATIENT
Start: 2019-11-30 | End: 2019-12-01 | Stop reason: HOSPADM

## 2019-11-30 RX ORDER — CARVEDILOL 6.25 MG/1
6.25 TABLET ORAL 2 TIMES DAILY WITH MEALS
Status: DISCONTINUED | OUTPATIENT
Start: 2019-11-30 | End: 2019-11-30

## 2019-11-30 RX ADMIN — CARVEDILOL 3.12 MG: 3.12 TABLET, FILM COATED ORAL at 08:52

## 2019-11-30 RX ADMIN — APIXABAN 5 MG: 5 TABLET, FILM COATED ORAL at 08:52

## 2019-11-30 RX ADMIN — APIXABAN 5 MG: 5 TABLET, FILM COATED ORAL at 20:10

## 2019-11-30 RX ADMIN — DOXYCYCLINE 100 MG: 100 TABLET ORAL at 20:10

## 2019-11-30 RX ADMIN — DOXYCYCLINE 100 MG: 100 TABLET ORAL at 11:43

## 2019-11-30 RX ADMIN — SODIUM CHLORIDE, PRESERVATIVE FREE 10 ML: 5 INJECTION INTRAVENOUS at 20:10

## 2019-11-30 RX ADMIN — METOPROLOL TARTRATE 25 MG: 25 TABLET, FILM COATED ORAL at 20:10

## 2019-11-30 RX ADMIN — CEFDINIR 300 MG: 300 CAPSULE ORAL at 20:10

## 2019-11-30 RX ADMIN — METOPROLOL TARTRATE 5 MG: 5 INJECTION, SOLUTION INTRAVENOUS at 11:30

## 2019-11-30 RX ADMIN — METOPROLOL TARTRATE 25 MG: 25 TABLET, FILM COATED ORAL at 14:56

## 2019-12-01 VITALS
TEMPERATURE: 97.8 F | BODY MASS INDEX: 25.9 KG/M2 | OXYGEN SATURATION: 93 % | HEART RATE: 90 BPM | SYSTOLIC BLOOD PRESSURE: 120 MMHG | DIASTOLIC BLOOD PRESSURE: 88 MMHG | RESPIRATION RATE: 18 BRPM | HEIGHT: 73 IN | WEIGHT: 195.4 LBS

## 2019-12-01 LAB
BACTERIA SPEC AEROBE CULT: NORMAL
BACTERIA SPEC AEROBE CULT: NORMAL

## 2019-12-01 PROCEDURE — 99232 SBSQ HOSP IP/OBS MODERATE 35: CPT | Performed by: INTERNAL MEDICINE

## 2019-12-01 RX ORDER — METOPROLOL TARTRATE 50 MG/1
50 TABLET, FILM COATED ORAL EVERY 12 HOURS SCHEDULED
Qty: 60 TABLET | Refills: 0 | Status: SHIPPED | OUTPATIENT
Start: 2019-12-01 | End: 2020-04-30

## 2019-12-01 RX ORDER — METOPROLOL TARTRATE 50 MG/1
50 TABLET, FILM COATED ORAL EVERY 12 HOURS SCHEDULED
Status: DISCONTINUED | OUTPATIENT
Start: 2019-12-01 | End: 2019-12-01 | Stop reason: HOSPADM

## 2019-12-01 RX ORDER — CEFDINIR 300 MG/1
300 CAPSULE ORAL EVERY 12 HOURS SCHEDULED
Qty: 10 CAPSULE | Refills: 0 | Status: SHIPPED | OUTPATIENT
Start: 2019-12-01 | End: 2019-12-06

## 2019-12-01 RX ORDER — DOXYCYCLINE 100 MG/1
100 TABLET ORAL EVERY 12 HOURS SCHEDULED
Qty: 10 TABLET | Refills: 0 | Status: SHIPPED | OUTPATIENT
Start: 2019-12-01 | End: 2019-12-06

## 2019-12-01 RX ADMIN — APIXABAN 5 MG: 5 TABLET, FILM COATED ORAL at 08:59

## 2019-12-01 RX ADMIN — DOXYCYCLINE 100 MG: 100 TABLET ORAL at 08:58

## 2019-12-01 RX ADMIN — METOPROLOL TARTRATE 25 MG: 25 TABLET, FILM COATED ORAL at 08:58

## 2019-12-01 RX ADMIN — SODIUM CHLORIDE, PRESERVATIVE FREE 10 ML: 5 INJECTION INTRAVENOUS at 08:59

## 2019-12-01 RX ADMIN — CEFDINIR 300 MG: 300 CAPSULE ORAL at 08:59

## 2019-12-01 NOTE — NURSING NOTE
"Monitor room called and notified this nurse about patients HR in 160's( AFIB). This nurse went to check on patient and patient said \"I just waked to the door to get some coffee\". Patient denies SOA or pain.  "

## 2019-12-01 NOTE — PROGRESS NOTES
[facility] -  Progress Note     LOS: 5 days   Patient Care Team:  Yoav Johnson MD as PCP - General (General Practice)    Chief Complaint: initial presentation of septic shock     Subjective     Interval History:     Patient Complaints: The patient is currently without compliant and is awaiting discharge today.         Review of Systems:     Review of Systems   Constitutional: Negative for diaphoresis, fatigue, fever and unexpected weight change.   HENT: Negative for nosebleeds.    Respiratory: Negative for apnea, cough, chest tightness, shortness of breath and wheezing.    Cardiovascular: Negative for chest pain, palpitations and leg swelling.   Gastrointestinal: Negative for abdominal distention, nausea and vomiting.   Genitourinary: Negative for hematuria.   Musculoskeletal: Negative for gait problem.   Skin: Negative for color change.   Neurological: Negative for dizziness, syncope, weakness and light-headedness.     Objective     Vital Sign Min/Max for last 24 hours  Temp  Min: 97.5 °F (36.4 °C)  Max: 98 °F (36.7 °C)   BP  Min: 119/83  Max: 124/82   Pulse  Min: 68  Max: 117   Resp  Min: 14  Max: 18   SpO2  Min: 90 %  Max: 97 %   No Data Recorded   No Data Recorded         11/28/19 2021   Weight: 88.6 kg (195 lb 6.4 oz)       Physical Exam:    Physical Exam   Constitutional: He is oriented to person, place, and time. He appears well-developed and well-nourished. No distress.   HENT:   Head: Normocephalic and atraumatic.   Eyes: Pupils are equal, round, and reactive to light.   Neck: Normal range of motion. Neck supple. No JVD present. No thyromegaly present.   Cardiovascular: Normal rate, normal heart sounds and intact distal pulses. An irregularly irregular rhythm present. Exam reveals no gallop and no friction rub.   No murmur heard.  Pulmonary/Chest: Effort normal and breath sounds normal. No respiratory distress. He has no wheezes. He has no rales. He exhibits no tenderness.    Abdominal: Soft. Bowel sounds are normal. He exhibits no distension. There is no tenderness.   Musculoskeletal: Normal range of motion. He exhibits no edema.   Neurological: He is alert and oriented to person, place, and time. No cranial nerve deficit.   Skin: Skin is warm and dry. He is not diaphoretic.   Psychiatric: He has a normal mood and affect. His behavior is normal.     Results Review:   Lab Results (last 72 hours)     Procedure Component Value Units Date/Time    Respiratory Culture - Sputum, Cough [[id number]]  (Abnormal) Collected:  11/29/19 2156    Specimen:  Sputum from Cough Updated:  12/01/19 1010     Respiratory Culture Scant growth (1+) Yeast isolated      Scant growth (1+) Normal Respiratory Jaleel     Gram Stain Greater than 25 WBCs per low power field      Few (2+) Mixed gram positive jaleel      Few (2+) Epithelial cells seen    Blood Culture With ABAD - Blood, Arm, Right [[id number]] Collected:  11/26/19 2310    Specimen:  Blood from Arm, Right Updated:  11/30/19 2330     Blood Culture No growth at 4 days    Blood Culture With ABAD - Blood, Arm, Left [[id number]] Collected:  11/26/19 2313    Specimen:  Blood from Arm, Left Updated:  11/30/19 2330     Blood Culture No growth at 4 days    CBC & Differential [[id number]] Collected:  11/29/19 0430    Specimen:  Blood Updated:  11/29/19 0552    Narrative:       The following orders were created for panel order CBC & Differential.  Procedure                               Abnormality         Status                     ---------                               -----------         ------                     CBC Auto Differential[[id number]]        Normal              Final result                 Please view results for these tests on the individual orders.    CBC Auto Differential [[id number]]  (Normal) Collected:  11/29/19 0430    Specimen:  Blood Updated:  11/29/19 0552     WBC 5.97 10*3/mm3      RBC 5.03 10*6/mm3      Hemoglobin 14.9 g/dL      Hematocrit 43.8  %      MCV 87.1 fL      MCH 29.6 pg      MCHC 34.0 g/dL      RDW 13.1 %      RDW-SD 41.8 fl      MPV 11.3 fL      Platelets 167 10*3/mm3      Neutrophil % 62.5 %      Lymphocyte % 20.4 %      Monocyte % 11.1 %      Eosinophil % 5.2 %      Basophil % 0.3 %      Immature Grans % 0.5 %      Neutrophils, Absolute 3.73 10*3/mm3      Lymphocytes, Absolute 1.22 10*3/mm3      Monocytes, Absolute 0.66 10*3/mm3      Eosinophils, Absolute 0.31 10*3/mm3      Basophils, Absolute 0.02 10*3/mm3      Immature Grans, Absolute 0.03 10*3/mm3      nRBC 0.0 /100 WBC     Basic Metabolic Panel [[id number]]  (Normal) Collected:  11/29/19 0430    Specimen:  Blood Updated:  11/29/19 0512     Glucose 94 mg/dL      BUN 21 mg/dL      Creatinine 0.91 mg/dL      Sodium 142 mmol/L      Potassium 4.0 mmol/L      Chloride 105 mmol/L      CO2 27.0 mmol/L      Calcium 9.4 mg/dL      eGFR Non African Amer 84 mL/min/1.73      BUN/Creatinine Ratio 23.1     Anion Gap 10.0 mmol/L     Narrative:       GFR Normal >60  Chronic Kidney Disease <60  Kidney Failure <15              Echo EF Estimated  Lab Results   Component Value Date    ECHOEFEST 65 11/27/2019         Cath Ejection Fraction Quantitative  No results found for: [facility]        Medication Review: yes  Current Facility-Administered Medications   Medication Dose Route Frequency Provider Last Rate Last Dose   • acetaminophen (TYLENOL) tablet 650 mg  650 mg Oral Q6H PRN Charles Arroyo MD       • apixaban (ELIQUIS) tablet 5 mg  5 mg Oral Q12H Patrick Meek MD   5 mg at 12/01/19 0859   • calcium carbonate (TUMS) chewable tablet 500 mg (200 mg elemental)  1 tablet Oral TID PRN Rafa Reis MD   1 tablet at 11/28/19 0925   • cefdinir (OMNICEF) capsule 300 mg  300 mg Oral Q12H Rafa Reis MD   300 mg at 12/01/19 0859   • doxycycline (ADOXA) tablet 100 mg  100 mg Oral Q12H Rafa Reis MD   100 mg at 12/01/19 0858   • metoprolol tartrate (LOPRESSOR) tablet 50 mg  50 mg  Oral Q12H Sanya Meek MD       • sodium chloride 0.9 % flush 10 mL  10 mL Intravenous Q12H Charles Arroyo MD   10 mL at 12/01/19 0859   • sodium chloride 0.9 % flush 10 mL  10 mL Intravenous PRN Charles Arroyo MD   10 mL at 11/27/19 2007         Assessment/Plan     -Atrial fibrillation - rates now 90s- low 100s, newly diagnosed   -Septic shock - resolved ; had been treated outpatient for sinusitis prior to admission   -Acute kidney injury - resolved     Plan -  Continue Eliquis for anticoagulation   Lopressor increased to 50 mg BID for better rate control  Follow up 1 month - plan to schedule cardioversion at that time if he has not converted back to NSR     Stefany Roth, DAJA  12/01/19  12:38 PM

## 2019-12-01 NOTE — PLAN OF CARE
Problem: Patient Care Overview  Goal: Plan of Care Review  Outcome: Ongoing (interventions implemented as appropriate)   12/01/19 0355   Plan of Care Review   Progress improving   OTHER   Outcome Summary Patient has been Afib throughout the shift 70's-90's. Better rate controlled. No complaints. Patient ready to go home.    Coping/Psychosocial   Plan of Care Reviewed With patient       Problem: Sepsis/Septic Shock (Adult)  Goal: Signs and Symptoms of Listed Potential Problems Will be Absent, Minimized or Managed (Sepsis/Septic Shock)  Outcome: Ongoing (interventions implemented as appropriate)   11/30/19 1635 12/01/19 0355   Goal/Outcome Evaluation   Problems Assessed (Sepsis) --  all   Problems Present (Sepsis) none --       11/30/19 1635 12/01/19 0355   Goal/Outcome Evaluation   Problems Assessed (Sepsis) --  all   Problems Present (Sepsis) none --        Problem: Fall Risk (Adult)  Goal: Absence of Fall  Outcome: Ongoing (interventions implemented as appropriate)   12/01/19 0355   Fall Risk (Adult)   Absence of Fall making progress toward outcome       Problem: Arrhythmia/Dysrhythmia (Symptomatic) (Adult)  Goal: Signs and Symptoms of Listed Potential Problems Will be Absent, Minimized or Managed (Arrhythmia/Dysrhythmia)  Outcome: Ongoing (interventions implemented as appropriate)   11/30/19 1635 12/01/19 0355   Goal/Outcome Evaluation   Problems Assessed (Arrhythmia/Dysrhythmia) --  all   Problems Present (Dysrhythmia) electrophysiologic conduction defect --

## 2019-12-02 ENCOUNTER — READMISSION MANAGEMENT (OUTPATIENT)
Dept: CALL CENTER | Facility: HOSPITAL | Age: 64
End: 2019-12-02

## 2019-12-02 LAB
BACTERIA SPEC RESP CULT: ABNORMAL
BACTERIA SPEC RESP CULT: ABNORMAL
GRAM STN SPEC: ABNORMAL
R RICKETTSI IGG SER QL IA: NORMAL
R RICKETTSI IGG SER QL IA: POSITIVE
R RICKETTSI IGM TITR SER: 0.51 INDEX (ref 0–0.89)

## 2019-12-02 NOTE — PAYOR COMM NOTE
"[place] [facility] 12-1-19  [id number]   [phone]    Joaquin Llanes (64 y.o. Male)     Date of Birth Social Security Number Address Home Phone MRN    [id number]  [id number]   [facility] [address] [phone] [id number]    [employer] Marital Status          Other        Admission Date Admission Type Admitting Provider Attending Provider Department, Room/Bed    11/26/19 Urgent Rafa Reis MD  [facility] 4C, 473/1    Discharge Date Discharge Disposition Discharge Destination        12/1/2019 Home or Self Care              Attending Provider:  (none)   Allergies:  Penicillins, Bactrim [Sulfamethoxazole-trimethoprim]    Isolation:  None   Infection:  None   Code Status:  Prior    Ht:  185.4 cm (73\")   Wt:  88.6 kg (195 lb 6.4 oz)    Admission Cmt:  None   Principal Problem:  None                Active Insurance as of 11/26/2019     Primary Coverage     Payor Plan Insurance Group Employer/Plan Group    [employer]HE[employer] BLUE CROSS [facility] [employer] [facility] PPO [id number]     Payor Plan Address Payor Plan Phone Number Payor Plan Fax Number Effective Dates    [address] [phone]  10/1/2019 - None Entered    [place] [address]       Subscriber Name Subscriber Birth Date Member ID       JOAQUIN LLANES 1955 [id number]                 Emergency Contacts      (Rel.) Home Phone Work Phone Mobile Phone    SHANON LLANES (Brother) -- -- [phone]               Operative/Procedure Notes (all)      Charles Arroyo MD at 11/27/19 0018  Version 1 of 1     Procedure Orders    1. Insert Central Line At Bedside [[id number]] ordered by Charles Arroyo MD at 11/27/19 0018            Post-procedure Diagnoses    1. Shock, septic (CMS/HCC) [A41.9, R65.21]             Insert Central Line At Bedside  Date/Time: 11/27/2019 12:18 AM  Performed by: Charles Arroyo MD  Authorized by: Charles Arroyo MD   Consent: Verbal consent obtained. Written consent obtained.  Risks and " benefits: risks, benefits and alternatives were discussed  Consent given by: patient  Patient understanding: patient states understanding of the procedure being performed  Patient consent: the patient's understanding of the procedure matches consent given  Required items: required blood products, implants, devices, and special equipment available  Patient identity confirmed: anonymous protocol, patient vented/unresponsive    Anesthesia:  Local Anesthetic: lidocaine 1% without epinephrine  Anesthetic total: 8 mL    Sedation:  Patient sedated: no    Preparation: skin prepped with ChloraPrep  Skin prep agent dried: skin prep agent completely dried prior to procedure  Sterile barriers: all five maximum sterile barriers used - cap, mask, sterile gown, sterile gloves, and large sterile sheet  Hand hygiene: hand hygiene performed prior to central venous catheter insertion  Location details: right internal jugular  Patient position: reverse Trendelenburg  Catheter type: triple lumen  Pre-procedure: landmarks identified  Ultrasound guidance: yes  Number of attempts: 2  Post-procedure: line sutured and dressing applied  Assessment: blood return through all ports,  no pneumothorax on x-ray and placement verified by x-ray  Patient tolerance: Patient tolerated the procedure well with no immediate complications  Comments: Two attempts.  First attempt wire would not thread and patient kept having hiccups which was causing the needle to come out.  Next attempt with ease.           Electronically signed by Charles Arroyo MD at 11/27/19 0019          Physician Progress Notes (last 7 days) (Notes from 11/25/19 1055 through 12/02/19 1055)      Stefany Roth APRN at 12/01/19 1238     Attestation signed by Sanya Meek MD at 12/01/19 1257    I have seen and examined the patient. I have discussed the findings with the patient and the mid-level providers.    Subjective:  Patient relates feel fine and want to go home - he  is planning on staying with his son for a couple weeks in [place], [place]    Objective:    LUNGS: clear  CV: IRRR - HR better control  EXT: no c,c,e    A/P:   PERSISTENT AFIB - HR ok    OK FOR DISCHARGE - HAVE ADVISED AGAINST STRENUOUS EXERTION AND CESSATION OF ANY ACTIVITY THAT CAUSES BREATHLESSNESS, CP, PALPITATIONS OR LIGHT-HEADEDNESS. SHOULD AVOID CAFFEINE, DECONGESTANTS AND ETOH. WILL SEE IN THE OFFICE IN 1 MO AND PROBABLY PURSUE ECV AT THAT TIME IF HE HAS BEEN COMPLIANT WITH HIS DOAC.    THANKS      Sanya Meek MD                      [facility] -  Progress Note     LOS: 5 days   Patient Care Team:  Yoav Johnson MD as PCP - General (General Practice)    Chief Complaint: initial presentation of septic shock     Subjective     Interval History:     Patient Complaints: The patient is currently without compliant and is awaiting discharge today.         Review of Systems:     Review of Systems   Constitutional: Negative for diaphoresis, fatigue, fever and unexpected weight change.   HENT: Negative for nosebleeds.    Respiratory: Negative for apnea, cough, chest tightness, shortness of breath and wheezing.    Cardiovascular: Negative for chest pain, palpitations and leg swelling.   Gastrointestinal: Negative for abdominal distention, nausea and vomiting.   Genitourinary: Negative for hematuria.   Musculoskeletal: Negative for gait problem.   Skin: Negative for color change.   Neurological: Negative for dizziness, syncope, weakness and light-headedness.     Objective     Vital Sign Min/Max for last 24 hours  Temp  Min: 97.5 °F (36.4 °C)  Max: 98 °F (36.7 °C)   BP  Min: 119/83  Max: 124/82   Pulse  Min: 68  Max: 117   Resp  Min: 14  Max: 18   SpO2  Min: 90 %  Max: 97 %   No Data Recorded   No Data Recorded         11/28/19 2021   Weight: 88.6 kg (195 lb 6.4 oz)       Physical Exam:    Physical Exam   Constitutional: He is oriented to person, place, and time. He appears  well-developed and well-nourished. No distress.   HENT:   Head: Normocephalic and atraumatic.   Eyes: Pupils are equal, round, and reactive to light.   Neck: Normal range of motion. Neck supple. No JVD present. No thyromegaly present.   Cardiovascular: Normal rate, normal heart sounds and intact distal pulses. An irregularly irregular rhythm present. Exam reveals no gallop and no friction rub.   No murmur heard.  Pulmonary/Chest: Effort normal and breath sounds normal. No respiratory distress. He has no wheezes. He has no rales. He exhibits no tenderness.   Abdominal: Soft. Bowel sounds are normal. He exhibits no distension. There is no tenderness.   Musculoskeletal: Normal range of motion. He exhibits no edema.   Neurological: He is alert and oriented to person, place, and time. No cranial nerve deficit.   Skin: Skin is warm and dry. He is not diaphoretic.   Psychiatric: He has a normal mood and affect. His behavior is normal.     Results Review:   Lab Results (last 72 hours)     Procedure Component Value Units Date/Time    Respiratory Culture - Sputum, Cough [[id number]]  (Abnormal) Collected:  11/29/19 2156    Specimen:  Sputum from Cough Updated:  12/01/19 1010     Respiratory Culture Scant growth (1+) Yeast isolated      Scant growth (1+) Normal Respiratory Jaleel     Gram Stain Greater than 25 WBCs per low power field      Few (2+) Mixed gram positive jaleel      Few (2+) Epithelial cells seen    Blood Culture With ABAD - Blood, Arm, Right [[id number]] Collected:  11/26/19 2310    Specimen:  Blood from Arm, Right Updated:  11/30/19 2330     Blood Culture No growth at 4 days    Blood Culture With ABAD - Blood, Arm, Left [[id number]] Collected:  11/26/19 2313    Specimen:  Blood from Arm, Left Updated:  11/30/19 2330     Blood Culture No growth at 4 days    CBC & Differential [[id number]] Collected:  11/29/19 0430    Specimen:  Blood Updated:  11/29/19 0552    Narrative:       The following orders were created for panel  order CBC & Differential.  Procedure                               Abnormality         Status                     ---------                               -----------         ------                     CBC Auto Differential[[id number]]        Normal              Final result                 Please view results for these tests on the individual orders.    CBC Auto Differential [[id number]]  (Normal) Collected:  11/29/19 0430    Specimen:  Blood Updated:  11/29/19 0552     WBC 5.97 10*3/mm3      RBC 5.03 10*6/mm3      Hemoglobin 14.9 g/dL      Hematocrit 43.8 %      MCV 87.1 fL      MCH 29.6 pg      MCHC 34.0 g/dL      RDW 13.1 %      RDW-SD 41.8 fl      MPV 11.3 fL      Platelets 167 10*3/mm3      Neutrophil % 62.5 %      Lymphocyte % 20.4 %      Monocyte % 11.1 %      Eosinophil % 5.2 %      Basophil % 0.3 %      Immature Grans % 0.5 %      Neutrophils, Absolute 3.73 10*3/mm3      Lymphocytes, Absolute 1.22 10*3/mm3      Monocytes, Absolute 0.66 10*3/mm3      Eosinophils, Absolute 0.31 10*3/mm3      Basophils, Absolute 0.02 10*3/mm3      Immature Grans, Absolute 0.03 10*3/mm3      nRBC 0.0 /100 WBC     Basic Metabolic Panel [[id number]]  (Normal) Collected:  11/29/19 0430    Specimen:  Blood Updated:  11/29/19 0512     Glucose 94 mg/dL      BUN 21 mg/dL      Creatinine 0.91 mg/dL      Sodium 142 mmol/L      Potassium 4.0 mmol/L      Chloride 105 mmol/L      CO2 27.0 mmol/L      Calcium 9.4 mg/dL      eGFR Non African Amer 84 mL/min/1.73      BUN/Creatinine Ratio 23.1     Anion Gap 10.0 mmol/L     Narrative:       GFR Normal >60  Chronic Kidney Disease <60  Kidney Failure <15              Echo EF Estimated  Lab Results   Component Value Date    ECHOEFEST 65 11/27/2019         Cath Ejection Fraction Quantitative  No results found for: [facility]        Medication Review: yes  Current Facility-Administered Medications   Medication Dose Route Frequency Provider Last Rate Last Dose   • acetaminophen (TYLENOL) tablet 650 mg  650  mg Oral Q6H PRN Charles Arroyo MD       • apixaban (ELIQUIS) tablet 5 mg  5 mg Oral Q12H Patrick Meek MD   5 mg at 12/01/19 0859   • calcium carbonate (TUMS) chewable tablet 500 mg (200 mg elemental)  1 tablet Oral TID PRN Rafa Reis MD   1 tablet at 11/28/19 0925   • cefdinir (OMNICEF) capsule 300 mg  300 mg Oral Q12H Rafa Reis MD   300 mg at 12/01/19 0859   • doxycycline (ADOXA) tablet 100 mg  100 mg Oral Q12H Rafa Reis MD   100 mg at 12/01/19 0858   • metoprolol tartrate (LOPRESSOR) tablet 50 mg  50 mg Oral Q12H Sanya Meek MD       • sodium chloride 0.9 % flush 10 mL  10 mL Intravenous Q12H Charles Arroyo MD   10 mL at 12/01/19 0859   • sodium chloride 0.9 % flush 10 mL  10 mL Intravenous PRN Charles Arroyo MD   10 mL at 11/27/19 2007         Assessment/Plan     -Atrial fibrillation - rates now 90s- low 100s, newly diagnosed   -Septic shock - resolved ; had been treated outpatient for sinusitis prior to admission   -Acute kidney injury - resolved     Plan -  Continue Eliquis for anticoagulation   Lopressor increased to 50 mg BID for better rate control  Follow up 1 month - plan to schedule cardioversion at that time if he has not converted back to NSR     DAJA Guzman  12/01/19  12:38 PM              Electronically signed by Sanya Meek MD at 12/01/19 1257     Rafa Reis MD at 11/30/19 1209              [facility] Medicine Services  INPATIENT PROGRESS NOTE    Patient Name: Geoff Beal  Date of Admission: 11/26/2019  Today's Date: 11/30/19  Length of Stay: 4  Primary Care Physician: Yoav Johnson MD    Subjective   Chief Complaint: Weakness  HPI   64 year old male presenting with hypotension, lethargy and signs of sepsis following outpatient treatment of sinusitis. Afib noted and new.     11/28 Patient is awake and alert, no new complaints. No pain. Nasal congestion  improving.  11/29 Resting comfortably, no distress.   11/30 RVR persist at rest. Patient is eager to go, but not stable yet.     Review of Systems   All pertinent negatives and positives are as above. All other systems have been reviewed and are negative unless otherwise stated.     Objective    Temp:  [97.5 °F (36.4 °C)-97.8 °F (36.6 °C)] 97.8 °F (36.6 °C)  Heart Rate:  [81-98] 98  Resp:  [16-18] 18  BP: (102-120)/(66-86) 106/86  Physical Exam   Constitutional: He is oriented to person, place, and time. He appears well-developed and well-nourished.   HENT:   Head: Normocephalic and atraumatic.   Right Ear: External ear normal.   Left Ear: External ear normal.   Eyes: EOM are normal. Pupils are equal, round, and reactive to light. Right eye exhibits no discharge. Left eye exhibits no discharge. No scleral icterus.   Neck: Normal range of motion. No JVD present. No tracheal deviation present. No thyromegaly present.   Cardiovascular: Normal heart sounds.   Afib RVR    Pulmonary/Chest: Effort normal and breath sounds normal. No stridor. No respiratory distress. He has no wheezes. He has no rales.   Abdominal: Soft. Bowel sounds are normal. He exhibits no distension and no mass. There is no tenderness. There is no guarding.   Musculoskeletal: Normal range of motion. He exhibits no deformity.   Neurological: He is alert and oriented to person, place, and time. He displays normal reflexes. No cranial nerve deficit. He exhibits normal muscle tone. Coordination normal.   Skin: Skin is warm. Capillary refill takes less than 2 seconds. No erythema.   Psychiatric: He has a normal mood and affect.       Results Review:  I have reviewed the labs, radiology results, and diagnostic studies.    Laboratory Data:   Results from last 7 days   Lab Units 11/29/19  0430 11/28/19  0308 11/27/19  0614   WBC 10*3/mm3 5.97 12.36* 13.61*   HEMOGLOBIN g/dL 14.9 13.7 14.3   HEMATOCRIT % 43.8 40.3 43.3   PLATELETS 10*3/mm3 167 165 174       "  Results from last 7 days   Lab Units 11/29/19  0430 11/28/19  0308 11/27/19  0321 11/26/19  2312   SODIUM mmol/L 142 139 138 135*   POTASSIUM mmol/L 4.0 4.5 5.2 4.1   CHLORIDE mmol/L 105 107 105 105   CO2 mmol/L 27.0 26.0 23.0 20.0*   BUN mg/dL 21 23 38* 41*   CREATININE mg/dL 0.91 1.01 1.88* 2.12*   CALCIUM mg/dL 9.4 8.3* 8.1* 7.2*   BILIRUBIN mg/dL  --   --  0.2 0.2   ALK PHOS U/L  --   --  44 33*   ALT (SGPT) U/L  --   --  6 <5   AST (SGOT) U/L  --   --  26 18   GLUCOSE mg/dL 94 140* 143* 139*       Culture Data:   Blood Culture   Date Value Ref Range Status   11/26/2019 No growth at less than 24 hours  Preliminary   11/26/2019 No growth at less than 24 hours  Preliminary       Radiology Data:   Imaging Results (Last 24 Hours)     ** No results found for the last 24 hours. **          I have reviewed the patient's current medications.     Assessment/Plan     Active Hospital Problems    Diagnosis   • Shock, septic (CMS/HCC)   • Leukocytosis   • ROSALIA (acute kidney injury) (CMS/HCC) due to severe sepsis   • Bandemia   • Metabolic acidosis   • Fever   • Atrial fibrillation with RVR (CMS/HCC)   • Acute respiratory failure with hypoxia (CMS/HCC)       Care in medical floor  Cardiology evaluation noted > Coreg/Eliquis. Lopressor IV once this AM.  Blood cultures negative  IVF LR 50 cc/hour to saline lock.  Labs reviewed  Case discussed with family and patient.      Discharge Planning: Home when HR controlled.     Rafa Reis MD   11/30/19   12:09 PM    Electronically signed by Rafa Reis MD at 11/30/19 1210     Nessa Solitario PA-C at 11/30/19 1100     Attestation signed by Sanya Meek MD at 11/30/19 1237    I have seen and examined the patient. I have discussed the findings with the patient and the mid-level providers.    Subjective:  Patient relates \"feel great\". No cp or sob or light-headedness. Has been walking a little and he is still in afib with a rapid HR with " "minimal walking. He is requesting discharge.    Objective:    LUNGS: clear  CV: IRRR  EXT: no c,c,e    ECHO - normal lv function    A/P:   AFIB - persists and HR too high for discharge today - change Beta-blocker to Metoprolol and poss home tomorrow. Long discussion of the rationale with the pt.  SEPTIC SHOCK RESOLVED      Sanya Meek MD                      [facility], [facility] Progress Note     LOS: 4 days   Patient Care Team:  Yoav Johnson MD as PCP - General (General Practice)    Chief Complaint:  AF    Subjective     No cardiac c/o.  Pt insistent on going home.    Review of Systems:   A 10-point review of systems is obtained and negative except for otherwise mentioned above.    Objective     Vital Sign Min/Max for last 24 hours  Temp  Min: 97.5 °F (36.4 °C)  Max: 97.8 °F (36.6 °C)   BP  Min: 102/66  Max: 120/79   Pulse  Min: 81  Max: 97   Resp  Min: 16  Max: 18   SpO2  Min: 92 %  Max: 96 %   No Data Recorded   No Data Recorded     Flowsheet Rows      First Filed Value   Admission Height  185.4 cm (73\") Documented at 11/27/2019 0515   Admission Weight  90.7 kg (200 lb 0 oz) Documented at 11/26/2019 2030          Physical Exam:   General Appearance: alert, appears stated age and cooperative  Lungs: clear to auscultation, respirations regular, respirations even and respirations unlabored  Heart: regular rhythm & normal rate, normal S1, S2, no murmur, no gallop, no rub and no click     Results Review:     I reviewed the patient's new clinical results.    Results from last 7 days   Lab Units 11/29/19 0430   WBC 10*3/mm3 5.97   HEMOGLOBIN g/dL 14.9   HEMATOCRIT % 43.8   PLATELETS 10*3/mm3 167     Results from last 7 days   Lab Units 11/29/19 0430   SODIUM mmol/L 142   POTASSIUM mmol/L 4.0   CHLORIDE mmol/L 105   CO2 mmol/L 27.0   BUN mg/dL 21   CREATININE mg/dL 0.91   GLUCOSE mg/dL 94   CALCIUM mg/dL 9.4     Results from last 7 days   Lab Units 11/29/19 0430  " 11/27/19  0321   SODIUM mmol/L 142   < > 138   POTASSIUM mmol/L 4.0   < > 5.2   CHLORIDE mmol/L 105   < > 105   CO2 mmol/L 27.0   < > 23.0   BUN mg/dL 21   < > 38*   CREATININE mg/dL 0.91   < > 1.88*   CALCIUM mg/dL 9.4   < > 8.1*   BILIRUBIN mg/dL  --   --  0.2   ALK PHOS U/L  --   --  44   ALT (SGPT) U/L  --   --  6   AST (SGOT) U/L  --   --  26   GLUCOSE mg/dL 94   < > 143*    < > = values in this interval not displayed.         Results from last 7 days   Lab Units 11/26/19  2312   TSH uIU/mL 0.303           Medication Review: yes    Assessment/Plan       Shock, septic (CMS/[facility])    Leukocytosis    ROSALIA (acute kidney injury) (CMS/[facility]) due to severe sepsis    Bandemia    Metabolic acidosis    Fever    Atrial fibrillation with RVR (CMS/[facility])    Acute respiratory failure with hypoxia (CMS/[facility])    Telemetry reveals .  Mostly running fast.  Will increase PO BB.  Monitor BP.  Continue Eliquis.    Nessa Solitario PA-C  11/30/19  11:02 AM          Electronically signed by Sanya Meek MD at 11/30/19 1237     Rafa Reis MD at 11/29/19 1252              [facility] Medicine Services  INPATIENT PROGRESS NOTE    Patient Name: Geoff Beal  Date of Admission: 11/26/2019  Today's Date: 11/29/19  Length of Stay: 3  Primary Care Physician: Yoav Johnson MD    Subjective   Chief Complaint: Weakness  HPI   64 year old male presenting with hypotension, lethargy and signs of sepsis following outpatient treatment of sinusitis. Afib noted and new.     11/28 Patient is awake and alert, no new complaints. No pain. Nasal congestion improving.  11/29 Resting comfortably, no distress.     Review of Systems   All pertinent negatives and positives are as above. All other systems have been reviewed and are negative unless otherwise stated.     Objective    Temp:  [97.6 °F (36.4 °C)-97.9 °F (36.6 °C)] 97.7 °F (36.5 °C)  Heart Rate:  [] 96  Resp:  [16-20] 16  BP:  ()/(71-84) 109/80  Physical Exam   Constitutional: He is oriented to person, place, and time. He appears well-developed and well-nourished.   HENT:   Head: Normocephalic and atraumatic.   Right Ear: External ear normal.   Left Ear: External ear normal.   Eyes: EOM are normal. Pupils are equal, round, and reactive to light. Right eye exhibits no discharge. Left eye exhibits no discharge. No scleral icterus.   Neck: Normal range of motion. No JVD present. No tracheal deviation present. No thyromegaly present.   Cardiovascular: Normal rate, regular rhythm and normal heart sounds.   Pulmonary/Chest: Effort normal and breath sounds normal. No stridor. No respiratory distress. He has no wheezes. He has no rales.   Abdominal: Soft. Bowel sounds are normal. He exhibits no distension and no mass. There is no tenderness. There is no guarding.   Musculoskeletal: Normal range of motion. He exhibits no deformity.   Neurological: He is alert and oriented to person, place, and time. He displays normal reflexes. No cranial nerve deficit. He exhibits normal muscle tone. Coordination normal.   Skin: Skin is warm. Capillary refill takes less than 2 seconds. No erythema.   Psychiatric: He has a normal mood and affect.     Congested nasally    Results Review:  I have reviewed the labs, radiology results, and diagnostic studies.    Laboratory Data:   Results from last 7 days   Lab Units 11/29/19  0430 11/28/19  0308 11/27/19  0614   WBC 10*3/mm3 5.97 12.36* 13.61*   HEMOGLOBIN g/dL 14.9 13.7 14.3   HEMATOCRIT % 43.8 40.3 43.3   PLATELETS 10*3/mm3 167 165 174        Results from last 7 days   Lab Units 11/29/19  0430 11/28/19  0308 11/27/19  0321 11/26/19  2312   SODIUM mmol/L 142 139 138 135*   POTASSIUM mmol/L 4.0 4.5 5.2 4.1   CHLORIDE mmol/L 105 107 105 105   CO2 mmol/L 27.0 26.0 23.0 20.0*   BUN mg/dL 21 23 38* 41*   CREATININE mg/dL 0.91 1.01 1.88* 2.12*   CALCIUM mg/dL 9.4 8.3* 8.1* 7.2*   BILIRUBIN mg/dL  --   --  0.2 0.2    ALK PHOS U/L  --   --  44 33*   ALT (SGPT) U/L  --   --  6 <5   AST (SGOT) U/L  --   --  26 18   GLUCOSE mg/dL 94 140* 143* 139*       Culture Data:   Blood Culture   Date Value Ref Range Status   11/26/2019 No growth at less than 24 hours  Preliminary   11/26/2019 No growth at less than 24 hours  Preliminary       Radiology Data:   Imaging Results (Last 24 Hours)     ** No results found for the last 24 hours. **          I have reviewed the patient's current medications.     Assessment/Plan     Active Hospital Problems    Diagnosis   • Shock, septic (CMS/HCC)   • Leukocytosis   • ROSALIA (acute kidney injury) (CMS/HCC) due to severe sepsis   • Bandemia   • Metabolic acidosis   • Fever   • Atrial fibrillation with RVR (CMS/HCC)   • Acute respiratory failure with hypoxia (CMS/HCC)       Care in medical floor  Cardiology evaluation noted > Coreg/Eliquis  Blood cultures negative  IVF LR 50 cc/hour  Labs reviewed  Case discussed with family and patient.      Discharge Planning: I expect the patient to be discharged to home tomorrow if no further events.    Rafa Reis MD   11/29/19   12:52 PM    Electronically signed by Rafa Reis MD at 11/29/19 5862     Patrick Meek MD at 11/29/19 7529             LOS: 3 days   Patient Care Team:  Yoav Johnson MD as PCP - General (General Practice)    Chief Complaint:   Shock, septic (CMS/HCC)    Leukocytosis    ROSALIA (acute kidney injury) (CMS/HCC) due to severe sepsis    Bandemia    Metabolic acidosis    Fever    Atrial fibrillation with RVR (CMS/HCC)    Acute respiratory failure with hypoxia (CMS/HCC)    Shortness of breath    Subjective    Denies any chest pain  No palpitations  Continues to have elevated ventricular rates  No orthopnea paroxysmal nocturnal dyspnea  No presyncope syncope.  Currently on telemetry floor   Latest test results reviewed  Overall labs show improvement  Leukocytosis is now resolved.  Hemoglobin is stable  Kidney function stable  and improved    Telemetry: no malignant arrhythmia. No significant pauses.  Atrial fibrillation with slightly elevated ventricular rates with minimal exertion    Review of Systems     Constitutional: No chills   Has fatigue   No fever.     HENT: Negative.    Eyes: Negative.      Respiratory: Negative for cough,   No chest wall soreness,   Shortness of breath,   no wheezing, no stridor.      Cardiovascular: Negative for chest pain,   No palpitations   No significant  leg swelling.     Gastrointestinal: Negative for abdominal distention,  No abdominal pain,   No blood in stool,   No constipation,   No diarrhea,   No nausea   No vomiting.     Endocrine: Negative.    Genitourinary: Negative for difficulty urinating, dysuria, flank pain and hematuria.     Musculoskeletal: Negative.    Skin: Negative for rash and wound.   Allergic/Immunologic: Negative.      Neurological: Negative for dizziness, syncope, weakness,   No light-headedness  No  headaches.     Hematological: Does not bruise/bleed easily.     Psychiatric/Behavioral: Negative for agitation or behavioral problems,   No confusion,   the patient is  nervous/anxious.       History:   Past Medical History:   Diagnosis Date   • Sinus congestion      No past surgical history on file.  Social History     Socioeconomic History   • Marital status:      Spouse name: Not on file   • Number of children: Not on file   • Years of education: Not on file   • Highest education level: Not on file   Tobacco Use   • Smoking status: Never Smoker   Substance and Sexual Activity   • Alcohol use: No     Frequency: Never   • Drug use: No     No family history on file.    Labs:  WBC WBC   Date Value Ref Range Status   11/29/2019 5.97 3.40 - 10.80 10*3/mm3 Final   11/28/2019 12.36 (H) 3.40 - 10.80 10*3/mm3 Final   11/27/2019 13.61 (H) 3.40 - 10.80 10*3/mm3 Final   11/26/2019 13.74 (H) 3.40 - 10.80 10*3/mm3 Final      HGB Hemoglobin   Date Value Ref Range Status   11/29/2019 14.9  13.0 - 17.7 g/dL Final   11/28/2019 13.7 13.0 - 17.7 g/dL Final   11/27/2019 14.3 13.0 - 17.7 g/dL Final   11/26/2019 12.7 (L) 13.0 - 17.7 g/dL Final      HCT Hematocrit   Date Value Ref Range Status   11/29/2019 43.8 37.5 - 51.0 % Final   11/28/2019 40.3 37.5 - 51.0 % Final   11/27/2019 43.3 37.5 - 51.0 % Final   11/26/2019 37.4 (L) 37.5 - 51.0 % Final      Platelets Platelets   Date Value Ref Range Status   11/29/2019 167 140 - 450 10*3/mm3 Final   11/28/2019 165 140 - 450 10*3/mm3 Final   11/27/2019 174 140 - 450 10*3/mm3 Final   11/26/2019 164 140 - 450 10*3/mm3 Final      MCV MCV   Date Value Ref Range Status   11/29/2019 87.1 79.0 - 97.0 fL Final   11/28/2019 89.8 79.0 - 97.0 fL Final   11/27/2019 91.0 79.0 - 97.0 fL Final   11/26/2019 89.3 79.0 - 97.0 fL Final        Results from last 7 days   Lab Units 11/29/19  0430 11/28/19  0308 11/27/19  0321 11/26/19  2312   SODIUM mmol/L 142 139 138 135*   POTASSIUM mmol/L 4.0 4.5 5.2 4.1   CHLORIDE mmol/L 105 107 105 105   CO2 mmol/L 27.0 26.0 23.0 20.0*   BUN mg/dL 21 23 38* 41*   CREATININE mg/dL 0.91 1.01 1.88* 2.12*   CALCIUM mg/dL 9.4 8.3* 8.1* 7.2*   BILIRUBIN mg/dL  --   --  0.2 0.2   ALK PHOS U/L  --   --  44 33*   ALT (SGPT) U/L  --   --  6 <5   AST (SGOT) U/L  --   --  26 18   GLUCOSE mg/dL 94 140* 143* 139*   No results found for: CKTOTAL, CKMB, CKMBINDEX, TROPONINI, TROPONINT  PT/INR:    Protime   Date Value Ref Range Status   11/26/2019 15.6 (H) 11.9 - 14.6 Seconds Final   /  INR   Date Value Ref Range Status   11/26/2019 1.20 (H) 0.91 - 1.09 Final       Imaging Results (Last 72 Hours)     Procedure Component Value Units Date/Time    SCANNED - IMAGING [[id number]] Resulted:  11/26/19      Updated:  11/27/19 1558    CT Abdomen Pelvis Without Contrast [[id number]] Collected:  11/27/19 0726     Updated:  11/27/19 0731    Narrative:       EXAMINATION: CT ABDOMEN PELVIS WO CONTRAST-      11/27/2019 12:30 AM CST     HISTORY: Sepsis; A41.9-Sepsis, unspecified  organism; R65.21-Severe  sepsis with septic shock     In order to have a CT radiation dose as low as reasonably achievable  Automated Exposure Control was utilized for adjustment of the mA and/or  KV according to patient size.     DLP in mGycm= 415.     Noncontrast abdomen pelvis CT with no comparison.     A preliminary [facility] report was faxed to the ICU immediately after this  study was interpreted at 1:45 AM.     Detail is affected by respiratory motion.     Normal noncontrast appearance of the liver, gallbladder, pancreas, and  spleen.  Normal and symmetric adrenal glands and kidneys.  No renal or ureteral stone.  No hydronephrosis.  Urinary bladder catheter is present.     There is no bowel dilation and no significant free fluid.     No appendicitis or diverticulitis.     Summary:  1. No acute abnormality is seen within the abdomen or pelvis.                                   This report was finalized on 11/27/2019 07:28 by Dr. Darius Frances MD.    CT Chest Without Contrast [[id number]] Collected:  11/27/19 0723     Updated:  11/27/19 0729    Narrative:       EXAMINATION: CT CHEST WO CONTRAST-      11/27/2019 12:30 AM CST     HISTORY: Sepsis; A41.9-Sepsis, unspecified organism; R65.21-Severe  sepsis with septic shock     In order to have a CT radiation dose as low as reasonably achievable  Automated Exposure Control was utilized for adjustment of the mA and/or  KV according to patient size.     DLP in mGycm= 356.     Noncontrast chest CT imaging with no comparison.     A preliminary [facility] report was faxed to the ICU immediately after this  study was interpreted at 1:33 AM.     Heart size is within normal limits.  There are scattered mediastinal lymph nodes measuring up to 1 cm.  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  Right jugular central line in good position.     The lungs show interstitial prominence which is nonspecific. There is no  overt failure.     There is  some dependent atelectasis though no more than trace pleural  fluid.     No focal consolidation though there is some subpleural nodular  infiltrate within both upper lobes.  No pneumothorax.     Summary:  1. Interstitial prominence with mild peripheral nodular infiltrate of  the upper lobes.  2. Patchy atelectasis.  3. No focal consolidation or overt failure.                                         This report was finalized on 11/27/2019 07:26 by Dr. Darius Frances MD.    XR Chest 1 View [[id number]] Collected:  11/27/19 0713     Updated:  11/27/19 0717    Narrative:       EXAMINATION: XR CHEST 1 VW-     11/27/2019 12:10 AM CST     HISTORY: line placement; A41.9-Sepsis, unspecified organism;  R65.21-Severe sepsis with septic shock     1 view chest x-ray with no comparison.     Normal heart size.  Normal appearance of the mediastinum.     A right jugular central line is in good position with the tip in the  lower SVC.     There is prominence of parenchymal lung markings. No focal infiltrate is  seen. Evidence of patchy atelectasis.  No pneumothorax or overt failure.     Summary:  1. Well-positioned right jugular central line.  2. Mild chronic lung changes  This report was finalized on 11/27/2019 07:14 by Dr. Darius Frances MD.          Objective     Allergies   Allergen Reactions   • Penicillins Unknown - Low Severity     PT does not recall.   • Bactrim [Sulfamethoxazole-Trimethoprim] Dizziness       Medication Review: Performed  Current Facility-Administered Medications   Medication Dose Route Frequency Provider Last Rate Last Dose   • acetaminophen (TYLENOL) tablet 650 mg  650 mg Oral Q6H PRN Charles Arroyo MD       • calcium carbonate (TUMS) chewable tablet 500 mg (200 mg elemental)  1 tablet Oral TID PRN Rafa Reis MD   1 tablet at 11/28/19 5109   • cefTRIAXone (ROCEPHIN) 2 g/100 mL 0.9% NS VTB (JULISSA)  2 g Intravenous Q24H Charles Arroyo MD   2 g at 11/28/19 6887   • doxycycline (VIBRAMYCIN)  "100 mg/100 mL 0.9% NS MBP  100 mg Intravenous Q12H Charles Arroyo MD   100 mg at 11/28/19 2305   • heparin (porcine) 5000 UNIT/ML injection 5,000 Units  5,000 Units Subcutaneous Q8H Charles Arroyo MD   5,000 Units at 11/29/19 0627   • lactated ringers infusion  75 mL/hr Intravenous Continuous Rafa Reis MD 75 mL/hr at 11/28/19 2111 75 mL/hr at 11/28/19 2111   • sodium chloride 0.9 % flush 10 mL  10 mL Intravenous Q12H Charles Arroyo MD   10 mL at 11/28/19 0814   • sodium chloride 0.9 % flush 10 mL  10 mL Intravenous PRN Charles Arroyo MD   10 mL at 11/27/19 2007       Vital Sign Min/Max for last 24 hours  Temp  Min: 97.6 °F (36.4 °C)  Max: 97.9 °F (36.6 °C)   BP  Min: 103/72  Max: 116/80   Pulse  Min: 87  Max: 108   Resp  Min: 16  Max: 20   SpO2  Min: 92 %  Max: 97 %   No Data Recorded   Weight  Min: 88.6 kg (195 lb 6.4 oz)  Max: 88.6 kg (195 lb 6.4 oz)     Flowsheet Rows      First Filed Value   Admission Height  185.4 cm (73\") Documented at 11/27/2019 0515   Admission Weight  90.7 kg (200 lb 0 oz) Documented at 11/26/2019 2030          Results for orders placed during the hospital encounter of 11/26/19   Adult Transthoracic Echo Complete W/ Cont if Necessary Per Protocol    Narrative · Estimated EF = 65%.  · Left ventricular systolic function is normal.  · Left atrial cavity size is mildly dilated.          Physical Exam:    General Appearance: Awake, alert, in no acute distress  Eyes: Pupils equal and reactive    Ears: Appear intact with no abnormalities noted  Nose: Nares normal, no drainage  Neck: supple, trachea midline, no carotid bruit and no JVD  Back: no kyphosis present,    Lungs: respirations regular, respirations even and respirations unlabored    Heart: normal S1, S2, no murmurs gallops or rubs    Abdomen: normal bowel sounds, no tenderness   Skin: no bleeding, bruising or rash  Extremities: no cyanosis  Psychiatric/Behavioral: Negative for agitation, behavioral " problems, confusion, the patient does  appear to be nervous/anxious.          Results Review:   I reviewed the patient's new clinical results.  I reviewed the patient's new imaging results and agree with the interpretation.  I reviewed the patient's other test results and agree with the interpretation  I personally viewed and interpreted the patient's EKG/Telemetry data  Discussed with patient    Reviewed available prior notes, consults, prior visits, laboratory findings, radiology and cardiology relevant reports. Updated chart as applicable. I have reviewed the patient's medical history in detail and updated the computerized patient record as relevant.      Updated patient regarding any new or relevant abnormalities on review of records or any new findings on physical exam. Mentioned to patient about purpose of visit and desirable health short and long term goals and objectives.     Assessment/Plan       Shock, septic (CMS/[facility])    Leukocytosis    ROSALIA (acute kidney injury) (CMS/[facility]) due to severe sepsis    Bandemia    Metabolic acidosis    Fever    Atrial fibrillation with RVR (CMS/HCC)    Acute respiratory failure with hypoxia (CMS/[facility])  Prediabetic  Diastolic heart failure    Plan    Discussed with patient  Can discontinue unfractionated heparin and start on Eliquis 5 mg p.o. twice daily.  We will start on Coreg 3.125 mg p.o. twice daily.  Treatment of comorbid conditions.  Monitor QTC  Repeat EKG  Moderate complexity decision making      Patrick Meek MD  11/29/19  7:39 AM    EMR Dragon/Transcription was used to dictate part of this note     Electronically signed by Patrick Meek MD at 11/29/19 9937     Rafa Reis MD at 11/28/19 7807              [facility] Medicine Services  INPATIENT PROGRESS NOTE    Patient Name: Geoff Beal  Date of Admission: 11/26/2019  Today's Date: 11/28/19  Length of Stay: 2  Primary Care Physician: Yoav Johnson MD    Subjective   Chief  Complaint: Weakness  HPI   64 year old male presenting with hypotension, lethargy and signs of sepsis following outpatient treatment of sinusitis. Afib noted and new.     11/28 Patient is awake and alert, no new complaints. No pain. Nasal congestion improving.    Review of Systems   All pertinent negatives and positives are as above. All other systems have been reviewed and are negative unless otherwise stated.     Objective    Temp:  [97.4 °F (36.3 °C)-98.1 °F (36.7 °C)] 97.4 °F (36.3 °C)  Heart Rate:  [] 95  Resp:  [14-28] 18  BP: ()/(54-93) 115/87  Physical Exam   Constitutional: He is oriented to person, place, and time. He appears well-developed and well-nourished.   HENT:   Head: Normocephalic and atraumatic.   Right Ear: External ear normal.   Left Ear: External ear normal.   Eyes: EOM are normal. Pupils are equal, round, and reactive to light. Right eye exhibits no discharge. Left eye exhibits no discharge. No scleral icterus.   Neck: Normal range of motion. No JVD present. No tracheal deviation present. No thyromegaly present.   Cardiovascular: Normal rate, regular rhythm and normal heart sounds.   Pulmonary/Chest: Effort normal and breath sounds normal. No stridor. No respiratory distress. He has no wheezes. He has no rales.   Abdominal: Soft. Bowel sounds are normal. He exhibits no distension and no mass. There is no tenderness. There is no guarding.   Musculoskeletal: Normal range of motion. He exhibits no deformity.   Neurological: He is alert and oriented to person, place, and time. He displays normal reflexes. No cranial nerve deficit. He exhibits normal muscle tone. Coordination normal.   Skin: Skin is warm. Capillary refill takes less than 2 seconds. No erythema.   Psychiatric: He has a normal mood and affect.     Congested nasally    Results Review:  I have reviewed the labs, radiology results, and diagnostic studies.    Laboratory Data:   Results from last 7 days   Lab Units  11/28/19  0308 11/27/19  0614 11/26/19  2312   WBC 10*3/mm3 12.36* 13.61* 13.74*   HEMOGLOBIN g/dL 13.7 14.3 12.7*   HEMATOCRIT % 40.3 43.3 37.4*   PLATELETS 10*3/mm3 165 174 164        Results from last 7 days   Lab Units 11/28/19  0308 11/27/19  0321 11/26/19  2312   SODIUM mmol/L 139 138 135*   POTASSIUM mmol/L 4.5 5.2 4.1   CHLORIDE mmol/L 107 105 105   CO2 mmol/L 26.0 23.0 20.0*   BUN mg/dL 23 38* 41*   CREATININE mg/dL 1.01 1.88* 2.12*   CALCIUM mg/dL 8.3* 8.1* 7.2*   BILIRUBIN mg/dL  --  0.2 0.2   ALK PHOS U/L  --  44 33*   ALT (SGPT) U/L  --  6 <5   AST (SGOT) U/L  --  26 18   GLUCOSE mg/dL 140* 143* 139*       Culture Data:   Blood Culture   Date Value Ref Range Status   11/26/2019 No growth at less than 24 hours  Preliminary   11/26/2019 No growth at less than 24 hours  Preliminary       Radiology Data:   Imaging Results (Last 24 Hours)     Procedure Component Value Units Date/Time    SCANNED - IMAGING [[id number]] Resulted:  11/26/19      Updated:  11/27/19 1558          I have reviewed the patient's current medications.     Assessment/Plan     Active Hospital Problems    Diagnosis   • Shock, septic (CMS/HCC)   • Leukocytosis   • ROSALIA (acute kidney injury) (CMS/HCC) due to severe sepsis   • Bandemia   • Metabolic acidosis   • Fever   • Atrial fibrillation with RVR (CMS/HCC)   • Acute respiratory failure with hypoxia (CMS/HCC)       Transfer to medical floor  Cardiology evaluation noted  Blood cultures negative  IVF   Labs reviewed  Case discussed with family and patient.      Discharge Planning: I expect the patient to be discharged to home in 1-3 days.    Rafa Reis MD   11/28/19   11:03 AM    Electronically signed by Rafa Reis MD at 11/28/19 8019     Rafa Reis MD at 11/27/19 8517              [facility] Medicine Services  INPATIENT PROGRESS NOTE    Patient Name: Geoff Beal  Date of Admission: 11/26/2019  Today's Date:  11/27/19  Length of Stay: 1  Primary Care Physician: Yoav Johnson MD    Subjective   Chief Complaint: Weakness  HPI   64 year old male presenting with hypotension, lethargy and signs of sepsis following outpatient treatment of sinusitis. Afib noted and new.     Review of Systems   All pertinent negatives and positives are as above. All other systems have been reviewed and are negative unless otherwise stated.     Objective    Temp:  [97.6 °F (36.4 °C)-98.5 °F (36.9 °C)] 97.6 °F (36.4 °C)  Heart Rate:  [] 98  Resp:  [17-27] 18  BP: ()/(46-89) 116/68  Physical Exam   Constitutional: He is oriented to person, place, and time. He appears well-developed and well-nourished.   HENT:   Head: Normocephalic and atraumatic.   Right Ear: External ear normal.   Left Ear: External ear normal.   Eyes: EOM are normal. Pupils are equal, round, and reactive to light. Right eye exhibits no discharge. Left eye exhibits no discharge. No scleral icterus.   Neck: Normal range of motion. No JVD present. No tracheal deviation present. No thyromegaly present.   Cardiovascular: Normal rate, regular rhythm and normal heart sounds.   Pulmonary/Chest: Effort normal and breath sounds normal. No stridor. No respiratory distress. He has no wheezes. He has no rales.   Abdominal: Soft. Bowel sounds are normal. He exhibits no distension and no mass. There is no tenderness. There is no guarding.   Musculoskeletal: Normal range of motion. He exhibits no deformity.   Neurological: He is alert and oriented to person, place, and time. He displays normal reflexes. No cranial nerve deficit. He exhibits normal muscle tone. Coordination normal.   Skin: Skin is warm. Capillary refill takes less than 2 seconds. No erythema.   Psychiatric: He has a normal mood and affect.     Congested nasally    Results Review:  I have reviewed the labs, radiology results, and diagnostic studies.    Laboratory Data:   Results from last 7 days   Lab Units  11/27/19  0614 11/26/19  2312   WBC 10*3/mm3 13.61* 13.74*   HEMOGLOBIN g/dL 14.3 12.7*   HEMATOCRIT % 43.3 37.4*   PLATELETS 10*3/mm3 174 164        Results from last 7 days   Lab Units 11/27/19  0321 11/26/19  2312   SODIUM mmol/L 138 135*   POTASSIUM mmol/L 5.2 4.1   CHLORIDE mmol/L 105 105   CO2 mmol/L 23.0 20.0*   BUN mg/dL 38* 41*   CREATININE mg/dL 1.88* 2.12*   CALCIUM mg/dL 8.1* 7.2*   BILIRUBIN mg/dL 0.2 0.2   ALK PHOS U/L 44 33*   ALT (SGPT) U/L 6 <5   AST (SGOT) U/L 26 18   GLUCOSE mg/dL 143* 139*       Culture Data:   Blood Culture   Date Value Ref Range Status   11/26/2019 No growth at less than 24 hours  Preliminary   11/26/2019 No growth at less than 24 hours  Preliminary       Radiology Data:   Imaging Results (Last 24 Hours)     Procedure Component Value Units Date/Time    SCANNED - IMAGING [[id number]] Resulted:  11/26/19      Updated:  11/27/19 1558    CT Abdomen Pelvis Without Contrast [[id number]] Collected:  11/27/19 0726     Updated:  11/27/19 0731    Narrative:       EXAMINATION: CT ABDOMEN PELVIS WO CONTRAST-      11/27/2019 12:30 AM CST     HISTORY: Sepsis; A41.9-Sepsis, unspecified organism; R65.21-Severe  sepsis with septic shock     In order to have a CT radiation dose as low as reasonably achievable  Automated Exposure Control was utilized for adjustment of the mA and/or  KV according to patient size.     DLP in mGycm= 415.     Noncontrast abdomen pelvis CT with no comparison.     A preliminary [facility] report was faxed to the ICU immediately after this  study was interpreted at 1:45 AM.     Detail is affected by respiratory motion.     Normal noncontrast appearance of the liver, gallbladder, pancreas, and  spleen.  Normal and symmetric adrenal glands and kidneys.  No renal or ureteral stone.  No hydronephrosis.  Urinary bladder catheter is present.     There is no bowel dilation and no significant free fluid.     No appendicitis or diverticulitis.     Summary:  1. No acute abnormality is  seen within the abdomen or pelvis.                                   This report was finalized on 11/27/2019 07:28 by Dr. Darius Frances MD.    CT Chest Without Contrast [[id number]] Collected:  11/27/19 0723     Updated:  11/27/19 0729    Narrative:       EXAMINATION: CT CHEST WO CONTRAST-      11/27/2019 12:30 AM CST     HISTORY: Sepsis; A41.9-Sepsis, unspecified organism; R65.21-Severe  sepsis with septic shock     In order to have a CT radiation dose as low as reasonably achievable  Automated Exposure Control was utilized for adjustment of the mA and/or  KV according to patient size.     DLP in mGycm= 356.     Noncontrast chest CT imaging with no comparison.     A preliminary [facility] report was faxed to the ICU immediately after this  study was interpreted at 1:33 AM.     Heart size is within normal limits.  There are scattered mediastinal lymph nodes measuring up to 1 cm.  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  Right jugular central line in good position.     The lungs show interstitial prominence which is nonspecific. There is no  overt failure.     There is some dependent atelectasis though no more than trace pleural  fluid.     No focal consolidation though there is some subpleural nodular  infiltrate within both upper lobes.  No pneumothorax.     Summary:  1. Interstitial prominence with mild peripheral nodular infiltrate of  the upper lobes.  2. Patchy atelectasis.  3. No focal consolidation or overt failure.                                         This report was finalized on 11/27/2019 07:26 by Dr. Darius Frances MD.    XR Chest 1 View [[id number]] Collected:  11/27/19 0713     Updated:  11/27/19 0717    Narrative:       EXAMINATION: XR CHEST 1 VW-     11/27/2019 12:10 AM CST     HISTORY: line placement; A41.9-Sepsis, unspecified organism;  R65.21-Severe sepsis with septic shock     1 view chest x-ray with no comparison.     Normal heart size.  Normal appearance of  the mediastinum.     A right jugular central line is in good position with the tip in the  lower SVC.     There is prominence of parenchymal lung markings. No focal infiltrate is  seen. Evidence of patchy atelectasis.  No pneumothorax or overt failure.     Summary:  1. Well-positioned right jugular central line.  2. Mild chronic lung changes  This report was finalized on 11/27/2019 07:14 by Dr. Darius Frances MD.          I have reviewed the patient's current medications.     Assessment/Plan     Active Hospital Problems    Diagnosis   • Shock, septic (CMS/HCC)   • Leukocytosis   • ROSALIA (acute kidney injury) (CMS/HCC) due to severe sepsis   • Bandemia   • Metabolic acidosis   • Fever   • Atrial fibrillation with RVR (CMS/HCC)   • Acute respiratory failure with hypoxia (CMS/HCC)       ICU care   Cardiology evaluation  Blood cultures negative  IVF   Labs reviewed  Case discussed with family, labs reviewed with family and current therapy and evolution discussed      Discharge Planning: I expect the patient to be discharged to home in 2-4 days.    Rafa Reis MD   11/27/19   4:20 PM    Electronically signed by Rafa Reis MD at 11/28/19 1102     Charles Arroyo MD at 11/26/19 2143              [facility] Medicine Services  SEVERE SEPSIS AND SEPTIC SHOCK FOCUSED EXAM    *Must be completed by a licensed independent practitioner within 6 hours of diagnosis for the following conditions: Septic Shock or Severe Sepsis with Lactate >/= 4.    Fluid bolus must be completed prior to assessment.      Admission Diagnosis: Severe Sepsis    Sepsis Classification: Septic Shock     Lactate Levels:   No results found for: LACTATE    I have reviewed the most up to date vital signs including heart rate, blood pressure, respiratory rate, oxygen saturation, and temperature.    General ill appearing   Respiratory accessory muscle use and crackles   Cardiac/Chest tachycardia   Skin  (documentation of skin color is required) pale and diaphoretic   Capillary Refill <3 seconds   Peripheral Pulses Checked                        radial  palpable, dorsal-pedis  palpable and posteria-tib   palpable   † Septic shock is defined by CMS as severe sepsis plus one of the following: persistent hypotension after fluid bolus OR tissue hypoperfusion (Lactic Acid ?4).    †† TWO OF THE FOLLOWING can be done in lieu of the Focused Exam: Measure CVP; Measure ScVO2; Bedside cardiovascular ultrasound; Dynamic assessment of fluid responsiveness with passive leg raise or fluid challenge.    Charles Arroyo MD  11/26/19  10:34 PM      Electronically signed by Charles Arroyo MD at 11/27/19 0206          Consult Notes (all)      Patrick Meek MD at 11/28/19 0644      Consult Orders    1. Inpatient Cardiology Consult [[id number]] ordered by Miller Norton MD at 11/28/19 0602                   LOS: 2 days   Patient Care Team:  Yoav Johnson MD as PCP - General (General Practice)    Chief Complaint:   Shock, septic (CMS/[facility])    Leukocytosis    ROSALIA (acute kidney injury) (CMS/[facility]) due to severe sepsis    Bandemia    Metabolic acidosis    Fever    Atrial fibrillation with RVR (CMS/HCC)    Acute respiratory failure with hypoxia (CMS/[facility])    Reason for consultation: Atrial for ablation with fair rate control.    History of current illness    Exceedingly pleasant 61-year-old  male who was in [place] goes hunting until recently.  He came in with complaints of sinus congestion.  He had gone to his primary care provider.  He is now admitted to the ICU with sepsis and bandemia.  He was noted to be in atrial fibrillation with rapid ventricular response.  Denies any chest pain palpitation presyncope syncope.  No orthopnea paroxysmal nocturnal dyspnea.  He has history of snoring.  His ventricular rates were initially markedly elevated but currently under excellent control and continues to be in atrial fibrillation  He  is hemodynamically stable.  Echocardiogram performed shows soft biventricular function with mild left atrial enlargement.  Denies any fever or chills right now  Continues on IV antibiotics.    Telemetry: no malignant arrhythmia. No significant pauses.  Atrial fibrillation with fair rate control    Review of Systems     Constitutional: Fatigue and chills  HENT: Sinus infection   eyes: Negative.      Respiratory: Negative for cough,   No chest wall soreness,   Shortness of breath,   no wheezing, no stridor.      Cardiovascular: Negative for chest pain,   No palpitations   No significant  leg swelling.     Gastrointestinal: Negative for abdominal distention,  No abdominal pain,   No blood in stool,   No constipation,   No diarrhea,   No nausea   No vomiting.     Endocrine: Negative.    Genitourinary: Negative for difficulty urinating, dysuria, flank pain and hematuria.     Musculoskeletal: Negative.    Skin: Negative for rash and wound.   Allergic/Immunologic: Negative.      Neurological: Negative for dizziness, syncope, weakness,   No light-headedness  No  headaches.     Hematological: Does not bruise/bleed easily.     Psychiatric/Behavioral: Negative for agitation or behavioral problems,   No confusion,   the patient is  nervous/anxious.       History:   Past Medical History:   Diagnosis Date   • Sinus congestion      No past surgical history on file.  Social History     Socioeconomic History   • Marital status:      Spouse name: Not on file   • Number of children: Not on file   • Years of education: Not on file   • Highest education level: Not on file   Tobacco Use   • Smoking status: Never Smoker   Substance and Sexual Activity   • Alcohol use: No     Frequency: Never   • Drug use: No     No family history on file.    Labs:  WBC WBC   Date Value Ref Range Status   11/28/2019 12.36 (H) 3.40 - 10.80 10*3/mm3 Final   11/27/2019 13.61 (H) 3.40 - 10.80 10*3/mm3 Final   11/26/2019 13.74 (H) 3.40 - 10.80 10*3/mm3  Final      HGB Hemoglobin   Date Value Ref Range Status   11/28/2019 13.7 13.0 - 17.7 g/dL Final   11/27/2019 14.3 13.0 - 17.7 g/dL Final   11/26/2019 12.7 (L) 13.0 - 17.7 g/dL Final      HCT Hematocrit   Date Value Ref Range Status   11/28/2019 40.3 37.5 - 51.0 % Final   11/27/2019 43.3 37.5 - 51.0 % Final   11/26/2019 37.4 (L) 37.5 - 51.0 % Final      Platelets Platelets   Date Value Ref Range Status   11/28/2019 165 140 - 450 10*3/mm3 Final   11/27/2019 174 140 - 450 10*3/mm3 Final   11/26/2019 164 140 - 450 10*3/mm3 Final      MCV MCV   Date Value Ref Range Status   11/28/2019 89.8 79.0 - 97.0 fL Final   11/27/2019 91.0 79.0 - 97.0 fL Final   11/26/2019 89.3 79.0 - 97.0 fL Final        Results from last 7 days   Lab Units 11/28/19  0308 11/27/19  0321 11/26/19  2312   SODIUM mmol/L 139 138 135*   POTASSIUM mmol/L 4.5 5.2 4.1   CHLORIDE mmol/L 107 105 105   CO2 mmol/L 26.0 23.0 20.0*   BUN mg/dL 23 38* 41*   CREATININE mg/dL 1.01 1.88* 2.12*   CALCIUM mg/dL 8.3* 8.1* 7.2*   BILIRUBIN mg/dL  --  0.2 0.2   ALK PHOS U/L  --  44 33*   ALT (SGPT) U/L  --  6 <5   AST (SGOT) U/L  --  26 18   GLUCOSE mg/dL 140* 143* 139*   No results found for: CKTOTAL, CKMB, CKMBINDEX, TROPONINI, TROPONINT  PT/INR:    Protime   Date Value Ref Range Status   11/26/2019 15.6 (H) 11.9 - 14.6 Seconds Final   /  INR   Date Value Ref Range Status   11/26/2019 1.20 (H) 0.91 - 1.09 Final       Imaging Results (Last 72 Hours)     Procedure Component Value Units Date/Time    SCANNED - IMAGING [[id number]] Resulted:  11/26/19      Updated:  11/27/19 1558    CT Abdomen Pelvis Without Contrast [[id number]] Collected:  11/27/19 0726     Updated:  11/27/19 0731    Narrative:       EXAMINATION: CT ABDOMEN PELVIS WO CONTRAST-      11/27/2019 12:30 AM CST     HISTORY: Sepsis; A41.9-Sepsis, unspecified organism; R65.21-Severe  sepsis with septic shock     In order to have a CT radiation dose as low as reasonably achievable  Automated Exposure Control was  utilized for adjustment of the mA and/or  KV according to patient size.     DLP in mGycm= 415.     Noncontrast abdomen pelvis CT with no comparison.     A preliminary [employer] report was faxed to the ICU immediately after this  study was interpreted at 1:45 AM.     Detail is affected by respiratory motion.     Normal noncontrast appearance of the liver, gallbladder, pancreas, and  spleen.  Normal and symmetric adrenal glands and kidneys.  No renal or ureteral stone.  No hydronephrosis.  Urinary bladder catheter is present.     There is no bowel dilation and no significant free fluid.     No appendicitis or diverticulitis.     Summary:  1. No acute abnormality is seen within the abdomen or pelvis.                                   This report was finalized on 11/27/2019 07:28 by Dr. Darius Frances MD.    CT Chest Without Contrast [[id number]] Collected:  11/27/19 0723     Updated:  11/27/19 0729    Narrative:       EXAMINATION: CT CHEST WO CONTRAST-      11/27/2019 12:30 AM CST     HISTORY: Sepsis; A41.9-Sepsis, unspecified organism; R65.21-Severe  sepsis with septic shock     In order to have a CT radiation dose as low as reasonably achievable  Automated Exposure Control was utilized for adjustment of the mA and/or  KV according to patient size.     DLP in mGycm= 356.     Noncontrast chest CT imaging with no comparison.     A preliminary [facility] report was faxed to the ICU immediately after this  study was interpreted at 1:33 AM.     Heart size is within normal limits.  There are scattered mediastinal lymph nodes measuring up to 1 cm.  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  Right jugular central line in good position.     The lungs show interstitial prominence which is nonspecific. There is no  overt failure.     There is some dependent atelectasis though no more than trace pleural  fluid.     No focal consolidation though there is some subpleural nodular  infiltrate within  both upper lobes.  No pneumothorax.     Summary:  1. Interstitial prominence with mild peripheral nodular infiltrate of  the upper lobes.  2. Patchy atelectasis.  3. No focal consolidation or overt failure.                                         This report was finalized on 11/27/2019 07:26 by Dr. Darius Frances MD.    XR Chest 1 View [[id number]] Collected:  11/27/19 0713     Updated:  11/27/19 0717    Narrative:       EXAMINATION: XR CHEST 1 VW-     11/27/2019 12:10 AM CST     HISTORY: line placement; A41.9-Sepsis, unspecified organism;  R65.21-Severe sepsis with septic shock     1 view chest x-ray with no comparison.     Normal heart size.  Normal appearance of the mediastinum.     A right jugular central line is in good position with the tip in the  lower SVC.     There is prominence of parenchymal lung markings. No focal infiltrate is  seen. Evidence of patchy atelectasis.  No pneumothorax or overt failure.     Summary:  1. Well-positioned right jugular central line.  2. Mild chronic lung changes  This report was finalized on 11/27/2019 07:14 by Dr. Darius Frances MD.          Objective     Allergies   Allergen Reactions   • Penicillins Unknown - Low Severity     PT does not recall.   • Bactrim [Sulfamethoxazole-Trimethoprim] Dizziness       Medication Review: Performed  Current Facility-Administered Medications   Medication Dose Route Frequency Provider Last Rate Last Dose   • acetaminophen (TYLENOL) tablet 650 mg  650 mg Oral Q6H PRN Charles Arroyo MD       • cefTRIAXone (ROCEPHIN) 2 g/100 mL 0.9% NS VTB (JULISSA)  2 g Intravenous Q24H Charles Arroyo MD   2 g at 11/27/19 2234   • doxycycline (VIBRAMYCIN) 100 mg/100 mL 0.9% NS MBP  100 mg Intravenous Q12H Charles Arroyo MD   100 mg at 11/27/19 2234   • heparin (porcine) 5000 UNIT/ML injection 5,000 Units  5,000 Units Subcutaneous Q8H Charles Arroyo MD   5,000 Units at 11/28/19 0602   • lactated ringers infusion  125 mL/hr Intravenous Continuous  "Rafa Reis  mL/hr at 11/27/19 1722 125 mL/hr at 11/27/19 1722   • norepinephrine (LEVOPHED) 8,000 mcg in sodium chloride 0.9 % 250 mL (32 mcg/mL) infusion  0.02-0.3 mcg/kg/min Intravenous Titrated Charles Arroyo MD       • sodium chloride 0.9 % flush 10 mL  10 mL Intravenous Q12H Charles Arroyo MD   10 mL at 11/27/19 2007   • sodium chloride 0.9 % flush 10 mL  10 mL Intravenous PRN Charles Arroyo MD   10 mL at 11/27/19 2007       Vital Sign Min/Max for last 24 hours  Temp  Min: 97.4 °F (36.3 °C)  Max: 98.1 °F (36.7 °C)   BP  Min: 84/65  Max: 116/68   Pulse  Min: 79  Max: 139   Resp  Min: 14  Max: 28   SpO2  Min: 83 %  Max: 98 %   No Data Recorded   No Data Recorded     Flowsheet Rows      First Filed Value   Admission Height  185.4 cm (73\") Documented at 11/27/2019 0515   Admission Weight  90.7 kg (200 lb 0 oz) Documented at 11/26/2019 2030          Results for orders placed during the hospital encounter of 11/26/19   Adult Transthoracic Echo Complete W/ Cont if Necessary Per Protocol    Narrative · Estimated EF = 65%.  · Left ventricular systolic function is normal.  · Left atrial cavity size is mildly dilated.          Physical Exam:    General Appearance: Awake, alert, in no acute distress  Eyes: Pupils equal and reactive    Ears: Appear intact with no abnormalities noted  Nose: Nares normal, no drainage  Neck: supple, trachea midline, no carotid bruit and no JVD  Back: no kyphosis present,    Lungs: respirations regular, respirations even and respirations unlabored    Heart: normal S1, S2, irregular  Abdomen: normal bowel sounds, no tenderness   Skin: no bleeding, bruising or rash  Extremities: no cyanosis  Psychiatric/Behavioral: Negative for agitation, behavioral problems, confusion, the patient does  appear to be nervous/anxious.          Results Review:   I reviewed the patient's new clinical results.  I reviewed the patient's new imaging results and agree with the " interpretation.  I reviewed the patient's other test results and agree with the interpretation  I personally viewed and interpreted the patient's EKG/Telemetry data  Discussed with patient    Reviewed available prior notes, consults, prior visits, laboratory findings, radiology and cardiology relevant reports. Updated chart as applicable. I have reviewed the patient's medical history in detail and updated the computerized patient record as relevant.      Updated patient regarding any new or relevant abnormalities on review of records or any new findings on physical exam. Mentioned to patient about purpose of visit and desirable health short and long term goals and objectives.     Assessment/Plan       Shock, septic (CMS/HCC)    Leukocytosis    ROSALIA (acute kidney injury) (CMS/HCC) due to severe sepsis    Bandemia    Metabolic acidosis    Fever    Atrial fibrillation with RVR (CMS/HCC)    Acute respiratory failure with hypoxia (CMS/HCC)      Plan    Continue current medications  Overall ventricular rates are under good control  We will watch patient closely  Suggest outpatient work-up for obstructive sleep apnea.  Given significant issues keep in  ICU until decided otherwise by primary attending  Monitor QTC  Short-term anticoagulation as feasible    Patrick Meek MD  11/28/19  6:44 AM    EMR Dragon/Transcription was used to dictate part of this note     Electronically signed by Patrick Meek MD at 11/28/19 0689          Discharge Summary      Rafa Reis MD at 12/01/19 1226              [facility] Medicine Services  DISCHARGE SUMMARY       Date of Admission: 11/26/2019  Date of Discharge:  12/1/2019  Primary Care Physician: Yoav Johnson MD    Presenting Problem/History of Present Illness:  Severe sepsis (CMS/HCC) [A41.9, R65.20]     Final Discharge Diagnoses:  Active Hospital Problems    Diagnosis   • Shock, septic (CMS/HCC)   • Leukocytosis   • ROSALIA (acute kidney injury)  (CMS/HCC) due to severe sepsis   • Bandemia   • Metabolic acidosis   • Fever   • Atrial fibrillation with RVR (CMS/HCC)   • Acute respiratory failure with hypoxia (CMS/HCC)   Sepsis suspected due to atypical pneumonia present on admission and improved  Acute sinusitis improving  ROSALIA resolved  Afib new onset    Consults: Cardiology    Pertinent Test Results:   Echocardiogram 11/26/19  · Estimated EF = 65%.  · Left ventricular systolic function is normal.  · Left atrial cavity size is mildly dilated.    CT Chest wo contrast 11/27/19  1. Interstitial prominence with mild peripheral nodular infiltrate of  the upper lobes.  2. Patchy atelectasis.  3. No focal consolidation or overt failure.    History of Present Illness on Day of Discharge: Ambulating in no distress.     Hospital Course:  The patient is a 64 y.o. male who presented to [facility] with complaints of sinus congestion that failed azithromycin and bactrim outpatient. This was compounded by weakness and lethargy.  He presented in transfer, hypotensive and in ROSALIA.    Was admitted to critical care and treated with IVF, Rocephin and Doxycycline. Cardiology was consulted due to persistent atrial fibrillation of new onset.     WBC improved to normal, so did creatinine and BUN. Nasal congestion is improved. Blood cultures are negative, Influenza swab is negative. Respiratory culture and stool PCR are negative. CT chest showed instertitial changes and reactive lymph nodes. Breathing is improved and he does not require supplemental oxygen since 11/27.    Afib with RVR about 105-115 has persisted beyond the initial acute illness state. Cardiology was consulted and obtained Echocardiogram. Recommended Coreg and Eliquis. Then switched to Lopressor and Eliquis with good results. He had no palpitations or chest pain with Afib.       Condition on Discharge:  Stable    Physical Exam on Discharge:  /88 (BP Location: Left arm, Patient Position: Sitting)    "Pulse 90   Temp 97.8 °F (36.6 °C) (Oral)   Resp 18   Ht 185.4 cm (73\")   Wt 88.6 kg (195 lb 6.4 oz)   SpO2 93%   BMI 25.78 kg/m²    Physical Exam   Constitutional: He is oriented to person, place, and time. He appears well-developed and well-nourished. No distress.   HENT:   Head: Normocephalic and atraumatic.   Right Ear: External ear normal.   Left Ear: External ear normal.   Eyes: EOM are normal. Pupils are equal, round, and reactive to light.   Neck: Normal range of motion. Neck supple. No JVD present. No thyromegaly present.   Cardiovascular: Normal rate, regular rhythm and normal heart sounds.   Pulmonary/Chest: Effort normal and breath sounds normal. No stridor. No respiratory distress. He has no wheezes. He has no rales.   Abdominal: Soft. Bowel sounds are normal. He exhibits no distension and no mass. There is no tenderness. There is no guarding.   Musculoskeletal: Normal range of motion. He exhibits no edema.   Neurological: He is alert and oriented to person, place, and time. He displays normal reflexes. No cranial nerve deficit. He exhibits normal muscle tone. Coordination normal.   Skin: Skin is warm. Capillary refill takes less than 2 seconds. He is not diaphoretic. No erythema.   Psychiatric: He has a normal mood and affect.     Discharge Disposition:  Home    Discharge Medications:     Discharge Medications      New Medications      Instructions Start Date   apixaban 5 MG tablet tablet  Commonly known as:  ELIQUIS   5 mg, Oral, Every 12 Hours Scheduled      cefdinir 300 MG capsule  Commonly known as:  OMNICEF   300 mg, Oral, Every 12 Hours Scheduled      doxycycline 100 MG tablet  Commonly known as:  ADOXA   100 mg, Oral, Every 12 Hours Scheduled      metoprolol tartrate 50 MG tablet  Commonly known as:  LOPRESSOR   50 mg, Oral, Every 12 Hours Scheduled         Continue These Medications      Instructions Start Date   hydrocortisone 2.5 % rectal cream  Commonly known as:  ANUSOL-HC   1 " application, Rectal, 2 Times Daily         Stop These Medications    azithromycin 250 MG tablet  Commonly known as:  ZITHROMAX            Discharge Diet:   Cardiac    Activity at Discharge:   Resume usual activity    Discharge Care Plan/Instructions:   Consider follow up CT chest in 2-3 months follow up Insterstitial prominence and lymph nodes    Follow-up Appointments:   PCP 1-2 weeks  Cardiology 2-4 weeks      Rafa Reis MD  12/01/19  12:23 PM    Time: 34 minutes        Electronically signed by Rafa Reis MD at 12/01/19 1060

## 2019-12-02 NOTE — OUTREACH NOTE
Prep Survey      Responses   Facility patient discharged from?  [facility]   Is patient eligible?  Yes   Discharge diagnosis  septic shock,  severe sepsis   Does the patient have one of the following disease processes/diagnoses(primary or secondary)?  Sepsis   Does the patient have Home health ordered?  No   Is there a DME ordered?  No   Comments regarding appointments  call for apmt   Medication alerts for this patient  eliquis, omnicef, adoxa, lopressor   Prep survey completed?  Yes          Isabel Hager RN

## 2019-12-03 LAB
A PHAGOCYTOPH DNA BLD QL NAA+PROBE: NEGATIVE
E CHAFFEENSIS DNA BLD QL NAA+PROBE: NEGATIVE

## 2019-12-04 ENCOUNTER — READMISSION MANAGEMENT (OUTPATIENT)
Dept: CALL CENTER | Facility: HOSPITAL | Age: 64
End: 2019-12-04

## 2019-12-04 NOTE — OUTREACH NOTE
Sepsis Week 1 Survey      Responses   Facility patient discharged from?  [place]   Does the patient have one of the following disease processes/diagnoses(primary or secondary)?  Sepsis   Is there a successful TCM telephone encounter documented?  No   Week 1 attempt successful?  No   Unsuccessful attempts  Attempt 1          Estrellita Damon RN

## 2019-12-05 ENCOUNTER — READMISSION MANAGEMENT (OUTPATIENT)
Dept: CALL CENTER | Facility: HOSPITAL | Age: 64
End: 2019-12-05

## 2019-12-05 NOTE — OUTREACH NOTE
Sepsis Week 1 Survey      Responses   Facility patient discharged from?  [facility]   Does the patient have one of the following disease processes/diagnoses(primary or secondary)?  Sepsis   Is there a successful TCM telephone encounter documented?  No   Week 1 attempt successful?  No   Unsuccessful attempts  Attempt 2          Re Ruby RN

## 2019-12-09 ENCOUNTER — READMISSION MANAGEMENT (OUTPATIENT)
Dept: CALL CENTER | Facility: HOSPITAL | Age: 64
End: 2019-12-09

## 2019-12-09 NOTE — OUTREACH NOTE
Sepsis Week 2 Survey      Responses   Facility patient discharged from?  [place]   Does the patient have one of the following disease processes/diagnoses(primary or secondary)?  Sepsis   Week 2 attempt successful?  No   Unsuccessful attempts  Attempt 1          Hetal Shea RN

## 2019-12-11 ENCOUNTER — READMISSION MANAGEMENT (OUTPATIENT)
Dept: CALL CENTER | Facility: HOSPITAL | Age: 64
End: 2019-12-11

## 2019-12-11 NOTE — OUTREACH NOTE
Sepsis Week 2 Survey      Responses   Facility patient discharged from?  [place]   Does the patient have one of the following disease processes/diagnoses(primary or secondary)?  Sepsis   Week 2 attempt successful?  Yes   Call start time  1543   Rescheduled  Revoked   Revoke  Decline to participate   Call end time  1544   Discharge diagnosis  septic shock,  severe sepsis          Alexandrea White RN

## 2019-12-23 ENCOUNTER — OFFICE VISIT (OUTPATIENT)
Dept: CARDIOLOGY | Facility: CLINIC | Age: 64
End: 2019-12-23

## 2019-12-23 VITALS
HEIGHT: 72 IN | SYSTOLIC BLOOD PRESSURE: 110 MMHG | BODY MASS INDEX: 25.6 KG/M2 | HEART RATE: 76 BPM | DIASTOLIC BLOOD PRESSURE: 78 MMHG | WEIGHT: 189 LBS | OXYGEN SATURATION: 98 %

## 2019-12-23 DIAGNOSIS — R53.83 EASY FATIGABILITY: ICD-10-CM

## 2019-12-23 DIAGNOSIS — R94.31 ABNORMAL ECG: ICD-10-CM

## 2019-12-23 DIAGNOSIS — I48.0 PAF (PAROXYSMAL ATRIAL FIBRILLATION) (HCC): Primary | ICD-10-CM

## 2019-12-23 DIAGNOSIS — I51.7 MILD LEFT ATRIAL ENLARGEMENT: ICD-10-CM

## 2019-12-23 DIAGNOSIS — R06.83 SNORING: ICD-10-CM

## 2019-12-23 PROCEDURE — 99214 OFFICE O/P EST MOD 30 MIN: CPT | Performed by: INTERNAL MEDICINE

## 2019-12-23 PROCEDURE — 93000 ELECTROCARDIOGRAM COMPLETE: CPT | Performed by: INTERNAL MEDICINE

## 2019-12-23 NOTE — PROGRESS NOTES
Geoff Beal  [id number]  1955  64 y.o.  male    Referring Provider: Yoav Johnson MD    Reason for  Visit: short term office follow up after recent encounter   Paroxysmal atrial fibrillation now  sinus rhythm  Recent admission and discharge for acute respiratory failure    Subjective    Mild chronic exertional shortness of breath on exertion relieved with rest  No significant cough or wheezing  Going on for several months or longer    No palpitations  No associated chest pain  No significant pedal edema    No fever or chills  No significant expectoration    No hemoptysis  No presyncope or syncope     Easy fatiguability     Has moderate snoring    History of present illness:  Geoff Beal is a 64 y.o. yo male with history of Paroxysmal atrial fibrillation  who presents today for   Chief Complaint   Patient presents with   • Atrial Fibrillation     3 week follow up - results echo    .    History  Past Medical History:   Diagnosis Date   • Sinus congestion    ,   History reviewed. No pertinent surgical history.,   History reviewed. No pertinent family history.,   Social History     Tobacco Use   • Smoking status: Never Smoker   • Smokeless tobacco: Never Used   Substance Use Topics   • Alcohol use: No     Frequency: Never   • Drug use: No   ,     Medications  Current Outpatient Medications   Medication Sig Dispense Refill   • apixaban (ELIQUIS) 5 MG tablet tablet Take 1 tablet by mouth Every 12 (Twelve) Hours. 60 tablet 0   • hydrocortisone (ANUSOL-HC) 2.5 % rectal cream Insert 1 application into the rectum 2 (Two) Times a Day.     • metoprolol tartrate (LOPRESSOR) 50 MG tablet Take 1 tablet by mouth Every 12 (Twelve) Hours. 60 tablet 0     No current facility-administered medications for this visit.        Allergies:  Penicillins and Bactrim [sulfamethoxazole-trimethoprim]    Review of Systems  Review of Systems   Constitution: Positive for malaise/fatigue. Negative for chills, fever and night sweats.  "  HENT: Negative.    Eyes: Negative.    Cardiovascular: Positive for dyspnea on exertion. Negative for chest pain, claudication, cyanosis, irregular heartbeat, leg swelling, near-syncope, orthopnea, palpitations, paroxysmal nocturnal dyspnea and syncope.   Respiratory: Negative.  Negative for cough.    Endocrine: Negative.    Hematologic/Lymphatic: Negative.    Skin: Negative.    Musculoskeletal: Negative for arthritis, back pain and joint pain.   Gastrointestinal: Negative for anorexia.   Genitourinary: Negative.    Neurological: Negative.    Psychiatric/Behavioral: Negative.        Objective     Physical Exam:  /78 (BP Location: Right arm, Patient Position: Sitting, Cuff Size: Adult)   Pulse 76   Ht 182.9 cm (72\")   Wt 85.7 kg (189 lb)   SpO2 98%   BMI 25.63 kg/m²     Physical Exam   Constitutional: He appears well-developed.   HENT:   Head: Normocephalic.   Neck: Normal carotid pulses and no JVD present. No tracheal tenderness present. Carotid bruit is not present. No tracheal deviation and no edema present.   Cardiovascular: Regular rhythm, normal heart sounds and normal pulses.   Pulmonary/Chest: Effort normal. No stridor.   Abdominal: Soft. He exhibits no distension. There is no hepatosplenomegaly. There is no tenderness.   Neurological: He is alert. He has normal strength. No cranial nerve deficit or sensory deficit.   Skin: Skin is warm.   Psychiatric: He has a normal mood and affect. His speech is normal and behavior is normal.       Results Review:      Results for orders placed during the hospital encounter of 11/26/19   Adult Transthoracic Echo Complete W/ Cont if Necessary Per Protocol    Narrative · Estimated EF = 65%.  · Left ventricular systolic function is normal.  · Left atrial cavity size is mildly dilated.                ECG 12 Lead  Date/Time: 12/23/2019 9:21 AM  Performed by: Patrick Meek MD  Authorized by: Patrick eMek MD   Comparison: compared with previous ECG from " 11/29/2019  Comparison to previous ECG:  sinus rhythm replaced atrial fibrillation    Rhythm: sinus bradycardia  Rate: bradycardic  Conduction: conduction normal  Q waves: V1, V2 and V3    T Waves: T waves normal  QRS axis: normal  Other: no other findings    Clinical impression: abnormal EKG            Assessment/Plan   Geoff was seen today for atrial fibrillation.    Diagnoses and all orders for this visit:    PAF (paroxysmal atrial fibrillation) (CMS/[facility])  -     ECG 12 Lead    Easy fatigability    Mild left atrial enlargement    Abnormal ECG           Plan      Can taper and discontinue beta blocker therapy   Continue on anticoagulation     Recommend evaluation for obstructive sleep apnea given snoring and daytime somnolence and fatigue by primary provider  In addition has body habitus including oropharyngeal crowding increasing the likelihood of obstructive sleep apnea        Advised against drinking alcohol    ____________________________________________________________________________________________________________________________________________  Health maintenance and recommendations      Low salt/ HTN/ Heart healthy carbohydrate restricted cardiac diet   The patient is advised to reduce or avoid caffeine or other cardiac stimulants.     Minimize or avoid  NSAID-type medications        Monitor for any signs of bleeding including red or dark stools. Fall precautions.        Advised staying uptodate with immunizations per established standard guidelines.      Offered to give patient  a copy of my notes       Questions were encouraged, asked and answered to the patient's  understanding and satisfaction. Questions if any regarding current medications and side effects, need for refills and importance of compliance to medications stressed.    Reviewed available prior notes, consults, prior visits, laboratory findings, radiology and cardiology relevant reports. Updated chart as applicable. I have reviewed the  patient's medical history in detail and updated the computerized patient record as relevant.      Updated patient regarding any new or relevant abnormalities on review of records or any new findings on physical exam. Mentioned to patient about purpose of visit and desirable health short and long term goals and objectives.    Primary to monitor CBC CMP Lipid panel and TSH as applicable    ___________________________________________________________________________________________________________________________________________          Return in about 3 months (around 3/23/2020).

## 2020-04-30 ENCOUNTER — OFFICE VISIT (OUTPATIENT)
Dept: CARDIOLOGY | Facility: CLINIC | Age: 65
End: 2020-04-30

## 2020-04-30 VITALS — WEIGHT: 190 LBS | BODY MASS INDEX: 25.18 KG/M2 | HEIGHT: 73 IN

## 2020-04-30 DIAGNOSIS — R53.83 EASY FATIGABILITY: ICD-10-CM

## 2020-04-30 DIAGNOSIS — I51.7 MILD LEFT ATRIAL ENLARGEMENT: ICD-10-CM

## 2020-04-30 DIAGNOSIS — I48.0 PAF (PAROXYSMAL ATRIAL FIBRILLATION) (HCC): Primary | ICD-10-CM

## 2020-04-30 DIAGNOSIS — R94.31 ABNORMAL ECG: ICD-10-CM

## 2020-04-30 PROCEDURE — 99213 OFFICE O/P EST LOW 20 MIN: CPT | Performed by: INTERNAL MEDICINE

## 2020-04-30 NOTE — PROGRESS NOTES
Geoff Beal  [id number]  1955  64 y.o.  male      This is a telephonic visit  You have chosen to receive care through a telephone visit. Do you consent to use a telephone visit for your medical care today? Yes     Referring Provider: Yoav Johnson MD    Reason for  Visit: Virtual visit  This is being performed per CDC guidelines.  Prior history of paroxysmal atrial fibrillation after admission for acute upper respiratory infection    Last visit in office was maintaining sinus rhythm short term office follow up after recent encounter   Continues on anticoagulation with Eliquis    Overall feeling well   No chest pain or shortness of breath     No palpitations  No significant pedal edema    Compliant with medications and dietary advice  Good effort tolerance    No presyncope or syncope  Compliant with medications    Tolerating current medications well with no untoward side effects   Compliant with prescribed medication regimen. Tries to adhere to cardiac diet.      Has moderate snoring    History of present illness:  Geoff Beal is a 64 y.o. yo male with history of Paroxysmal atrial fibrillation  who presents today for   Chief Complaint   Patient presents with   • Atrial Fibrillation     Patient says he has not had any racing pounding, fluttering in his chest.   .    History  Past Medical History:   Diagnosis Date   • Sinus congestion    ,   History reviewed. No pertinent surgical history.,   History reviewed. No pertinent family history.,   Social History     Tobacco Use   • Smoking status: Never Smoker   • Smokeless tobacco: Never Used   Substance Use Topics   • Alcohol use: Yes     Frequency: Never     Comment: occasional   • Drug use: No   ,     Medications  Current Outpatient Medications   Medication Sig Dispense Refill   • apixaban (ELIQUIS) 5 MG tablet tablet Take 1 tablet by mouth Every 12 (Twelve) Hours. Indications: Atrial Fibrillation 60 tablet 11   • hydrocortisone (ANUSOL-HC) 2.5 % rectal  cream Insert 1 application into the rectum 2 (Two) Times a Day.       No current facility-administered medications for this visit.        Allergies:  Penicillins and Bactrim [sulfamethoxazole-trimethoprim]    Review of Systems  Review of Systems   Constitution: Positive for malaise/fatigue. Negative for chills, fever and night sweats.   HENT: Negative.    Eyes: Negative.    Cardiovascular: Positive for dyspnea on exertion. Negative for chest pain, claudication, cyanosis, irregular heartbeat, leg swelling, near-syncope, orthopnea, palpitations, paroxysmal nocturnal dyspnea and syncope.   Respiratory: Negative.  Negative for cough.    Endocrine: Negative.    Hematologic/Lymphatic: Negative.    Skin: Negative.    Musculoskeletal: Negative for arthritis, back pain and joint pain.   Gastrointestinal: Negative for anorexia.   Genitourinary: Negative.    Neurological: Negative.    Psychiatric/Behavioral: Negative.        Objective     Physical Exam:    • General demeanor : Patient is calm and composed.  There is no distress.  Patient can converse normally    • Description of patient's judgment and insight: Normal  • Brief assessment of mental status, which may include:  - Orientation to time, place, and person  - Recent and remote memory intact  - Mood and affect is normal  • Per patient no problems with vision or eyes that is new  • Per self examination of patient no issues with her ears or nose or gums or teeth  • Assessment of hearing: Normal  • But self examination of patient no reported mass in her neck    • Patient can carry out her normal speech and does not have any respiratory difficulties  • Per self examination of patient no significant lower extremity edema  • Per self examination of patient no reported skin issues that is new  • Per self-examination gait is normal  • Per self examination of patient digits and nails are normal without any cyanosis or swelling  • Per self examination of patient no issues with  joints or bones  • Assessment of range of motion with notation of any pain, crepitation or contracture              Results Review:      Results for orders placed during the hospital encounter of 11/26/19   Adult Transthoracic Echo Complete W/ Cont if Necessary Per Protocol    Narrative · Estimated EF = 65%.  · Left ventricular systolic function is normal.  · Left atrial cavity size is mildly dilated.              Procedures    Assessment/Plan   Geoff was seen today for atrial fibrillation.    Diagnoses and all orders for this visit:    PAF (paroxysmal atrial fibrillation) (CMS/[facility])  -     Holter Monitor - 72 Hour Up To 21 Days; Future    Easy fatigability    Abnormal ECG    Mild left atrial enlargement           Plan        Orders Placed This Encounter   Procedures   • Holter Monitor - 72 Hour Up To 21 Days     Standing Status:   Future     Standing Expiration Date:   4/30/2021     Order Specific Question:   Reason for exam?     Answer:   AFib      Discussed in detail with patient  We will order ZIO Patch as above  If no significant atrial fibrillation on ZIO Patch one option would be to discontinue Eliquis and take aspirin 81 mg p.o. daily  Will discuss this further after review of the patch results in approximately 6 weeks either in person or with telephone visit     Recommend evaluation for obstructive sleep apnea given snoring and daytime somnolence and fatigue by primary provider  In addition has body habitus including oropharyngeal crowding increasing the likelihood of obstructive sleep apnea      Total time spent on telephone with discussions planning 11-20 minutes     ____________________________________________________________________________________________________________________________________________  Health maintenance and recommendations    Similar recommendations as last visit     Questions were encouraged, asked and answered to the patient's  understanding and satisfaction. Questions if any  regarding current medications and side effects, need for refills and importance of compliance to medications stressed.    Reviewed available prior notes, consults, prior visits, laboratory findings, radiology and cardiology relevant reports. Updated chart as applicable. I have reviewed the patient's medical history in detail and updated the computerized patient record as relevant.      Updated patient regarding any new or relevant abnormalities on review of records or any new findings on physical exam. Mentioned to patient about purpose of visit and desirable health short and long term goals and objectives.    Primary to monitor CBC CMP Lipid panel and TSH as applicable    ___________________________________________________________________________________________________________________________________________        Return in about 6 weeks (around 6/11/2020).

## 2020-06-15 ENCOUNTER — OFFICE VISIT (OUTPATIENT)
Dept: CARDIOLOGY | Facility: CLINIC | Age: 65
End: 2020-06-15

## 2020-06-15 VITALS
HEIGHT: 72 IN | BODY MASS INDEX: 25.6 KG/M2 | SYSTOLIC BLOOD PRESSURE: 128 MMHG | WEIGHT: 189 LBS | DIASTOLIC BLOOD PRESSURE: 80 MMHG | HEART RATE: 77 BPM | OXYGEN SATURATION: 99 %

## 2020-06-15 DIAGNOSIS — I51.7 MILD LEFT ATRIAL ENLARGEMENT: ICD-10-CM

## 2020-06-15 DIAGNOSIS — I48.0 PAF (PAROXYSMAL ATRIAL FIBRILLATION) (HCC): Primary | ICD-10-CM

## 2020-06-15 DIAGNOSIS — R06.83 SNORING: ICD-10-CM

## 2020-06-15 PROCEDURE — 93000 ELECTROCARDIOGRAM COMPLETE: CPT | Performed by: INTERNAL MEDICINE

## 2020-06-15 PROCEDURE — 99214 OFFICE O/P EST MOD 30 MIN: CPT | Performed by: INTERNAL MEDICINE

## 2020-06-15 RX ORDER — ASPIRIN 81 MG/1
81 TABLET ORAL DAILY
Qty: 100 TABLET | Refills: 3 | Status: SHIPPED | OUTPATIENT
Start: 2020-06-15 | End: 2021-08-24

## 2020-06-15 NOTE — PROGRESS NOTES
Geoff Beal  [id number]  1955  64 y.o.  male    Referring Provider: Yoav Johnson MD    Reason for  Visit:     Here for routine follow up   Prior visit for short term office follow up after recent encounter   Paroxysmal atrial fibrillation now sinus rhythm  atrial fibrillation    only during hospitalization  Prior admission and discharge for acute respiratory failure  Cardiac workup test results as below   recent Zio patch some SVT but no paroxysmal atrial fibrillation     Subjective    Overall feeling well   No chest pain or shortness of breath     No palpitations  No significant pedal edema    Compliant with medications and dietary advice  Good effort tolerance    No presyncope or syncope  Compliant with medications    Tolerating current medications well with no untoward side effects   Compliant with prescribed medication regimen. Tries to adhere to cardiac diet.      Can do easily bicycling 10 miles a day 5/ week    Has moderate snoring    History of present illness:  Geoff Beal is a 64 y.o. yo male with history of Paroxysmal atrial fibrillation  who presents today for   Chief Complaint   Patient presents with   • Coronary Artery Disease     6 WK FU    .    History  Past Medical History:   Diagnosis Date   • Sinus congestion    ,   History reviewed. No pertinent surgical history.,   History reviewed. No pertinent family history.,   Social History     Tobacco Use   • Smoking status: Former Smoker     Years: 4.00     Types: Cigarettes   • Smokeless tobacco: Never Used   Substance Use Topics   • Alcohol use: Yes     Frequency: Never     Comment: occasional   • Drug use: No   ,     Medications  Current Outpatient Medications   Medication Sig Dispense Refill   • hydrocortisone (ANUSOL-HC) 2.5 % rectal cream Insert 1 application into the rectum 2 (Two) Times a Day.     • aspirin 81 MG EC tablet Take 1 tablet by mouth Daily. 100 tablet 3     No current facility-administered medications for this visit.   "      Allergies:  Penicillins and Bactrim [sulfamethoxazole-trimethoprim]    Review of Systems  Review of Systems   Constitution: Positive for malaise/fatigue. Negative for chills, fever and night sweats.   HENT: Negative.    Eyes: Negative.    Cardiovascular: Positive for dyspnea on exertion. Negative for chest pain, claudication, cyanosis, irregular heartbeat, leg swelling, near-syncope, orthopnea, palpitations, paroxysmal nocturnal dyspnea and syncope.   Respiratory: Negative.  Negative for cough.    Endocrine: Negative.    Hematologic/Lymphatic: Negative.    Skin: Negative.    Musculoskeletal: Negative for arthritis, back pain and joint pain.   Gastrointestinal: Negative for anorexia.   Genitourinary: Negative.    Neurological: Negative.    Psychiatric/Behavioral: Negative.        Objective     Physical Exam:  /80   Pulse 77   Ht 182.9 cm (72\")   Wt 85.7 kg (189 lb)   SpO2 99%   BMI 25.63 kg/m²     Physical Exam   Constitutional: He appears well-developed.   HENT:   Head: Normocephalic.   Neck: Normal carotid pulses and no JVD present. No tracheal tenderness present. Carotid bruit is not present. No tracheal deviation and no edema present.   Cardiovascular: Regular rhythm, normal heart sounds and normal pulses.   Pulmonary/Chest: Effort normal. No stridor.   Abdominal: Soft. He exhibits no distension. There is no hepatosplenomegaly. There is no tenderness.   Neurological: He is alert. He has normal strength. No cranial nerve deficit or sensory deficit.   Skin: Skin is warm.   Psychiatric: He has a normal mood and affect. His speech is normal and behavior is normal.       Results Review:      Geoff Beal   Holter Monitor 72Hr-Day (0296T/0298T)   Order# [id number]   Reading physician: Patrick Meek MD Ordering physician: Patrick Meek MD Study date: 20   Patient Information     Patient Name  Geoff Beal MRN  [id number] Sex  Male  (Age)  1955 (64 y.o.)   Interpretation Summary "        · Heart rates between 49 to 187 bpm, average 75 bpm     · Entire report was reviewed.  Monitoring in days: ~14   · The predominant rhythm noted during the testing period was sinus rhythm.     · Rare supraventricular ectopics with an APC burden of: < 1%  · 12 runs of supraventricular tachycardia longest 1 minute 6 seconds at a maximum rate of 187 bpm         · Rare premature ventricular contractions with a PVC burden of: < 1%  · No significant  ventricular tachy or belen arrhythmia.     · No correlated arrhythmia  · No significant pauses           Conclusion: Baseline rhythm is sinus.  No significant ectopy   12 runs of supraventricular tachycardia longest 1 minute and 6 seconds at a maximum rate of 187         Results for orders placed during the hospital encounter of 11/26/19   Adult Transthoracic Echo Complete W/ Cont if Necessary Per Protocol    Narrative · Estimated EF = 65%.  · Left ventricular systolic function is normal.  · Left atrial cavity size is mildly dilated.                ECG 12 Lead  Date/Time: 6/15/2020 2:26 PM  Performed by: Patrick Meek MD  Authorized by: Patrick Meek MD   Comparison: compared with previous ECG from 12/23/2019  Comparison to previous ECG: Ventricular rate increased from   59   to  74   beats per minute   Rhythm: sinus rhythm  Rate: normal  Conduction: conduction normal  ST Segments: ST segments normal  T Waves: T waves normal  QRS axis: normal  Other: no other findings    Clinical impression: normal ECG            Assessment/Plan   Geoff was seen today for coronary artery disease.    Diagnoses and all orders for this visit:    PAF (paroxysmal atrial fibrillation) (CMS/HCC)    Mild left atrial enlargement    Snoring    Other orders  -     ECG 12 Lead  -     aspirin 81 MG EC tablet; Take 1 tablet by mouth Daily.        _________________________________________________________________________________________________________  Health maintenance and recommendations    Similar  recommendations as last visit     Questions were encouraged, asked and answered to the patient's  understanding and satisfaction. Questions if any regarding current medications and side effects, need for refills and importance of compliance to medications stressed.    Reviewed available prior notes, consults, prior visits, laboratory findings, radiology and cardiology relevant reports. Updated chart as applicable. I have reviewed the patient's medical history in detail and updated the computerized patient record as relevant.      Updated patient regarding any new or relevant abnormalities on review of records or any new findings on physical exam. Mentioned to patient about purpose of visit and desirable health short and long term goals and objectives.    Primary to monitor CBC CMP Lipid panel and TSH as applicable    ___________________________________________________________________________________________________________________________________________       Plan      Discussed results of prior testing with patient  Patient expressed understanding  Encouraged and answered all questions   Discussed with the patient and all questioned fully answered. He will call me if any problems arise.          Discussed with him CHADSVASC2 score of 0 , will stop Eliquis and start Aspirin 81 mg daily  He is low risk (not no risk ) for CVA from PAF   Requested Prescriptions     Signed Prescriptions Disp Refills   • aspirin 81 MG EC tablet 100 tablet 3     Sig: Take 1 tablet by mouth Daily.          Recommend evaluation for obstructive sleep apnea given snoring and daytime somnolence and fatigue by primary provider  In addition has body habitus including oropharyngeal crowding increasing the likelihood of obstructive sleep apnea      Advised against drinking alcohol          Return in about 6 months (around 12/15/2020).

## 2020-12-15 ENCOUNTER — OFFICE VISIT (OUTPATIENT)
Dept: CARDIOLOGY | Facility: CLINIC | Age: 65
End: 2020-12-15

## 2020-12-15 VITALS
SYSTOLIC BLOOD PRESSURE: 126 MMHG | HEIGHT: 72 IN | DIASTOLIC BLOOD PRESSURE: 82 MMHG | OXYGEN SATURATION: 99 % | BODY MASS INDEX: 25.33 KG/M2 | HEART RATE: 71 BPM | WEIGHT: 187 LBS

## 2020-12-15 DIAGNOSIS — I48.0 PAF (PAROXYSMAL ATRIAL FIBRILLATION) (HCC): Primary | ICD-10-CM

## 2020-12-15 DIAGNOSIS — I47.1 PAROXYSMAL SVT (SUPRAVENTRICULAR TACHYCARDIA) (HCC): ICD-10-CM

## 2020-12-15 DIAGNOSIS — R94.31 ABNORMAL ECG: ICD-10-CM

## 2020-12-15 PROBLEM — I47.10 PAROXYSMAL SVT (SUPRAVENTRICULAR TACHYCARDIA): Status: ACTIVE | Noted: 2020-12-15

## 2020-12-15 PROCEDURE — 99213 OFFICE O/P EST LOW 20 MIN: CPT | Performed by: INTERNAL MEDICINE

## 2020-12-15 PROCEDURE — 93000 ELECTROCARDIOGRAM COMPLETE: CPT | Performed by: INTERNAL MEDICINE

## 2020-12-15 NOTE — PROGRESS NOTES
Geoff Beal  [id number]  1955  65 y.o.  male    Referring Provider: Rah Kearney DO    Reason for  Visit:     Here for routine follow up   Prior visit for short term office follow up after recent encounter   Paroxysmal atrial fibrillation now sinus rhythm  atrial fibrillation    only during hospitalization  Prior admission and discharge for acute respiratory failure  Cardiac workup test results as below   Prior Zio patch some SVT but no paroxysmal atrial fibrillation     Subjective      Overall feels the same   No new events or complaints since last visit   Overall the patient feels no major change from baseline symptoms   Similar symptoms as during last visit      Overall feeling well   No chest pain or shortness of breath     No palpitations  No significant pedal edema    No presyncope or syncope  Compliant with medications    Tolerating current medications well with no untoward side effects   Compliant with prescribed medication regimen. Tries to adhere to cardiac diet.      Can do easily bicycling 10 miles a day 5/ week  Overall excellent effort tolerance with no cardiovascular limitations and at the patient's baseline     Has moderate snoring    History of present illness:  Geoff Beal is a 65 y.o. yo male with history of Paroxysmal atrial fibrillation  who presents today for   Chief Complaint   Patient presents with   • Atrial Fibrillation     6 mo f/u   .    History  Past Medical History:   Diagnosis Date   • Sinus congestion    ,   History reviewed. No pertinent surgical history.,   History reviewed. No pertinent family history.,   Social History     Tobacco Use   • Smoking status: Former Smoker     Years: 4.00     Types: Cigarettes   • Smokeless tobacco: Never Used   Substance Use Topics   • Alcohol use: Yes     Frequency: Never     Comment: occasional   • Drug use: No   ,     Medications  Current Outpatient Medications   Medication Sig Dispense Refill   • aspirin 81 MG EC tablet Take  "1 tablet by mouth Daily. 100 tablet 3   • hydrocortisone (ANUSOL-HC) 2.5 % rectal cream Insert 1 application into the rectum 2 (Two) Times a Day.       No current facility-administered medications for this visit.        Allergies:  Penicillins and Bactrim [sulfamethoxazole-trimethoprim]    Review of Systems  Review of Systems   Constitution: Negative for chills, fever, malaise/fatigue and night sweats.   HENT: Negative.    Eyes: Negative.    Cardiovascular: Negative for chest pain, claudication, cyanosis, dyspnea on exertion, irregular heartbeat, leg swelling, near-syncope, orthopnea, palpitations, paroxysmal nocturnal dyspnea and syncope.   Respiratory: Negative.  Negative for cough.    Endocrine: Negative.    Hematologic/Lymphatic: Negative.    Skin: Negative.    Musculoskeletal: Negative for arthritis, back pain and joint pain.   Gastrointestinal: Negative for anorexia.   Genitourinary: Negative.    Neurological: Negative.    Psychiatric/Behavioral: Negative.    Same review of system and physical exam as last visit      Objective     Physical Exam:  /82   Pulse 71   Ht 182.9 cm (72\")   Wt 84.8 kg (187 lb)   SpO2 99%   BMI 25.36 kg/m²     Physical Exam   Constitutional: He appears well-developed.   HENT:   Head: Normocephalic.   Neck: Normal carotid pulses and no JVD present. No tracheal tenderness present. Carotid bruit is not present. No tracheal deviation and no edema present.   Cardiovascular: Regular rhythm, normal heart sounds and normal pulses.   Pulmonary/Chest: Effort normal. No stridor.   Abdominal: Soft. He exhibits no distension. There is no abdominal tenderness.   Neurological: He is alert. No cranial nerve deficit or sensory deficit.   Skin: Skin is warm.   Psychiatric: His speech is normal and behavior is normal.       Results Review:      Geoffbabita Beal   Holter Monitor 72Hr-21Day (0296T/0298T)   Order# [id number]   Reading physician: Patrick Meek MD Ordering physician: Patrick Meek MD " Study date: 20   Patient Information     Patient Name  Geoff Beal MRN  [id number] Sex  Male  (Age)  1955 (64 y.o.)   Interpretation Summary        · Heart rates between 49 to 187 bpm, average 75 bpm     · Entire report was reviewed.  Monitoring in days: ~14   · The predominant rhythm noted during the testing period was sinus rhythm.     · Rare supraventricular ectopics with an APC burden of: < 1%  · 12 runs of supraventricular tachycardia longest 1 minute 6 seconds at a maximum rate of 187 bpm         · Rare premature ventricular contractions with a PVC burden of: < 1%  · No significant  ventricular tachy or belen arrhythmia.     · No correlated arrhythmia  · No significant pauses           Conclusion: Baseline rhythm is sinus.  No significant ectopy   12 runs of supraventricular tachycardia longest 1 minute and 6 seconds at a maximum rate of 187         Results for orders placed during the hospital encounter of 19   Adult Transthoracic Echo Complete W/ Cont if Necessary Per Protocol    Narrative · Estimated EF = 65%.  · Left ventricular systolic function is normal.  · Left atrial cavity size is mildly dilated.         ____________________________________________________________________________________________________________________________________________  Health maintenance and recommendations  Similar recommendations as last visit     Offered to give patient  a copy      Questions were encouraged, asked and answered to the patient's  understanding and satisfaction. Questions if any regarding current medications and side effects, need for refills and importance of compliance to medications stressed.    Reviewed available prior notes, consults, prior visits, laboratory findings, radiology and cardiology relevant reports. Updated chart as applicable. I have reviewed the patient's medical history in detail and updated the computerized patient record as relevant.      Updated patient regarding  any new or relevant abnormalities on review of records or any new findings on physical exam. Mentioned to patient about purpose of visit and desirable health short and long term goals and objectives.    Primary to monitor CBC CMP Lipid panel and TSH as applicable    ___________________________________________________________________________________________________________________________________________           ECG 12 Lead    Date/Time: 12/15/2020 10:23 AM  Performed by: Patrick Meek MD  Authorized by: Patrick Meek MD   Comparison: compared with previous ECG   Comparison to previous ECG: Poor R wave progression   Rhythm: sinus rhythm  Rate: normal  Conduction: conduction normal  ST Segments: ST segments normal  T Waves: T waves normal  QRS axis: normal  Other findings: poor R wave progression    Clinical impression: abnormal EKG            Assessment/Plan   Diagnoses and all orders for this visit:    1. PAF (paroxysmal atrial fibrillation) (CMS/HCC) (Primary)    2. Paroxysmal SVT (supraventricular tachycardia) (CMS/HCC)    3. Abnormal ECG         Plan    Overall doing well no new cardiovascular symptoms and therefore no additional cardiac testing is required   If any interim issues arise will call me for further evaluation.     The current medical regimen is effective;  continue present plan and medications.     After discussion with him CHADSVASC2 score of 0 , stopped Eliquis and was started Aspirin 81 mg daily  He is low risk (not no risk ) for cerebrovascular accident and cardio embolism  from paroxysmal atrial fibrillation     Recommend evaluation for obstructive sleep apnea given snoring and daytime somnolence and fatigue by primary provider  In addition has body habitus including oropharyngeal crowding increasing the likelihood of obstructive sleep apnea      Advised again against drinking alcohol          Return in about 1 year (around 12/15/2021).

## 2021-08-24 RX ORDER — ASPIRIN 81 MG/1
TABLET, COATED ORAL
Qty: 100 TABLET | Refills: 3 | Status: SHIPPED | OUTPATIENT
Start: 2021-08-24 | End: 2022-12-16 | Stop reason: SDUPTHER

## 2021-12-15 ENCOUNTER — OFFICE VISIT (OUTPATIENT)
Dept: CARDIOLOGY | Facility: CLINIC | Age: 66
End: 2021-12-15

## 2021-12-15 VITALS
DIASTOLIC BLOOD PRESSURE: 82 MMHG | HEIGHT: 73 IN | HEART RATE: 71 BPM | BODY MASS INDEX: 25.98 KG/M2 | OXYGEN SATURATION: 99 % | SYSTOLIC BLOOD PRESSURE: 118 MMHG | WEIGHT: 196 LBS

## 2021-12-15 DIAGNOSIS — I47.1 PAROXYSMAL SVT (SUPRAVENTRICULAR TACHYCARDIA) (HCC): Primary | ICD-10-CM

## 2021-12-15 DIAGNOSIS — I48.0 PAF (PAROXYSMAL ATRIAL FIBRILLATION) (HCC): ICD-10-CM

## 2021-12-15 DIAGNOSIS — I51.7 MILD LEFT ATRIAL ENLARGEMENT: ICD-10-CM

## 2021-12-15 DIAGNOSIS — R94.31 ABNORMAL ECG: ICD-10-CM

## 2021-12-15 PROBLEM — J96.01 ACUTE RESPIRATORY FAILURE WITH HYPOXIA: Status: RESOLVED | Noted: 2019-11-27 | Resolved: 2021-12-15

## 2021-12-15 PROBLEM — I48.91 ATRIAL FIBRILLATION WITH RVR (HCC): Status: RESOLVED | Noted: 2019-11-27 | Resolved: 2021-12-15

## 2021-12-15 PROCEDURE — 99214 OFFICE O/P EST MOD 30 MIN: CPT | Performed by: INTERNAL MEDICINE

## 2021-12-15 PROCEDURE — 93000 ELECTROCARDIOGRAM COMPLETE: CPT | Performed by: INTERNAL MEDICINE

## 2021-12-15 NOTE — PROGRESS NOTES
Geoff Beal  [id number]  1955  66 y.o.  male    Referring Provider: Rah Kearney DO    Reason for  Visit:     Here for routine follow up   Prior visit for short term office follow up after recent encounter   Paroxysmal atrial fibrillation now sinus rhythm  atrial fibrillation only during hospitalization  Prior admission and discharge for acute respiratory failure  Cardiac workup test results as below   Prior Zio patch some SVT but no paroxysmal atrial fibrillation     Subjective    Excellent effort tolerance with no cardiovascular limitations and at the patient's baseline   Can bicycle up to 17 miles with no issues     No new events or complaints since last visit   Overall feeling well   No chest pain or shortness of breath     No palpitations  No significant pedal edema    No presyncope or syncope  Compliant with medications    Tolerating current medications well with no untoward side effects   Compliant with prescribed medication regimen. Tries to adhere to cardiac diet.      BP well controlled at home.     No bleeding, excessive bruising, gait instability or fall risks     Has moderate snoring      No recent labs       History of present illness:  Geoff Beal is a 66 y.o. yo male with  paroxysmal atrial fibrillation  who presents today for   Chief Complaint   Patient presents with   • Atrial Fibrillation     1 year follow up    .    History  Past Medical History:   Diagnosis Date   • Sinus congestion    ,   History reviewed. No pertinent surgical history.,   History reviewed. No pertinent family history.,   Social History     Tobacco Use   • Smoking status: Former Smoker     Years: 4.00     Types: Cigarettes   • Smokeless tobacco: Never Used   Substance Use Topics   • Alcohol use: Yes     Comment: occasional   • Drug use: No   ,     Medications  Current Outpatient Medications   Medication Sig Dispense Refill   • Aspirin Low Dose 81 MG EC tablet TAKE 1 TABLET BY MOUTH EVERY  tablet 3  "  • hydrocortisone (ANUSOL-HC) 2.5 % rectal cream Insert 1 application into the rectum 2 (Two) Times a Day.       No current facility-administered medications for this visit.       Allergies:  Penicillins and Bactrim [sulfamethoxazole-trimethoprim]    Review of Systems  Review of Systems   Constitutional: Negative for chills, fever, malaise/fatigue and night sweats.   HENT: Negative.    Eyes: Negative.    Cardiovascular: Negative for chest pain, claudication, cyanosis, dyspnea on exertion, irregular heartbeat, leg swelling, near-syncope, orthopnea, palpitations, paroxysmal nocturnal dyspnea and syncope.   Respiratory: Negative.  Negative for cough.    Endocrine: Negative.    Hematologic/Lymphatic: Negative.    Skin: Negative.    Musculoskeletal: Negative for arthritis, back pain and joint pain.   Gastrointestinal: Negative for anorexia.   Genitourinary: Negative.    Neurological: Negative.    Psychiatric/Behavioral: Negative.    Same review of system and physical exam as last visit      Objective     Physical Exam:  /82   Pulse 71   Ht 185.4 cm (73\")   Wt 88.9 kg (196 lb)   SpO2 99%   BMI 25.86 kg/m²     Physical Exam   Constitutional: He appears well-developed.   HENT:   Head: Normocephalic.   Neck: Normal carotid pulses and no JVD present. No tracheal tenderness present. Carotid bruit is not present. No tracheal deviation present.   Cardiovascular: Regular rhythm, normal heart sounds and normal pulses.   Pulmonary/Chest: Effort normal. No stridor.   Abdominal: Soft. He exhibits no distension. There is no abdominal tenderness.   Neurological: He is alert. No cranial nerve deficit or sensory deficit.   Skin: Skin is warm.   Psychiatric: His speech is normal and behavior is normal.       Results Review:      Geoff Beal   Holter Monitor 72Hr-21Day (0296T/0298T)   Order# [id number]   Reading physician: Patrick Meek MD Ordering physician: Patrick Meek MD Study date: 5/4/20   Patient Information "     Patient Name  eGoff Beal MRN  [id number] Sex  Male  (Age)  1955 (64 y.o.)   Interpretation Summary        · Heart rates between 49 to 187 bpm, average 75 bpm     · Entire report was reviewed.  Monitoring in days: ~14   · The predominant rhythm noted during the testing period was sinus rhythm.     · Rare supraventricular ectopics with an APC burden of: < 1%  · 12 runs of supraventricular tachycardia longest 1 minute 6 seconds at a maximum rate of 187 bpm         · Rare premature ventricular contractions with a PVC burden of: < 1%  · No significant  ventricular tachy or belen arrhythmia.     · No correlated arrhythmia  · No significant pauses           Conclusion: Baseline rhythm is sinus.  No significant ectopy   12 runs of supraventricular tachycardia longest 1 minute and 6 seconds at a maximum rate of 187         Results for orders placed during the hospital encounter of 19    Adult Transthoracic Echo Complete W/ Cont if Necessary Per Protocol    Interpretation Summary  · Estimated EF = 65%.  · Left ventricular systolic function is normal.  · Left atrial cavity size is mildly dilated.     ____________________________________________________________________________________________________________________________________________  Health maintenance and recommendations  Similar recommendations as last visit     Offered to give patient  a copy      Questions were encouraged, asked and answered to the patient's  understanding and satisfaction. Questions if any regarding current medications and side effects, need for refills and importance of compliance to medications stressed.    Reviewed available prior notes, consults, prior visits, laboratory findings, radiology and cardiology relevant reports. Updated chart as applicable. I have reviewed the patient's medical history in detail and updated the computerized patient record as relevant.      Updated patient regarding any new or relevant  abnormalities on review of records or any new findings on physical exam. Mentioned to patient about purpose of visit and desirable health short and long term goals and objectives.    Primary to monitor CBC CMP Lipid panel and TSH as applicable    ___________________________________________________________________________________________________________________________________________           ECG 12 Lead    Date/Time: 12/15/2021 10:11 AM  Performed by: Patrick Meek MD  Authorized by: Patrick Meek MD   Comparison: compared with previous ECG from 12/15/2020  Similar to previous ECG  Rhythm: sinus rhythm  Rate: normal  Conduction: conduction normal  Q waves: V1, V2 and V3    QRS axis: normal    Clinical impression: abnormal EKG            Assessment/Plan   Diagnoses and all orders for this visit:    1. Paroxysmal SVT (supraventricular tachycardia) (HCC) (Primary)  -     Adult Transthoracic Echo Complete w/ Color, Spectral and Contrast if necessary per protocol; Future    2. PAF (paroxysmal atrial fibrillation) (HCC)  -     Adult Transthoracic Echo Complete w/ Color, Spectral and Contrast if necessary per protocol; Future    3. Mild left atrial enlargement    4. Abnormal ECG  -     Adult Transthoracic Echo Complete w/ Color, Spectral and Contrast if necessary per protocol; Future    Other orders  -     ECG 12 Lead         Plan       Orders Placed This Encounter   Procedures   • ECG 12 Lead     This order was created via procedure documentation     Order Specific Question:   Release to patient     Answer:   Immediate   • Adult Transthoracic Echo Complete w/ Color, Spectral and Contrast if necessary per protocol     Myocardial strain to be performed during echocardiogram as long as technically feasible     Standing Status:   Future     Standing Expiration Date:   12/15/2022     Order Specific Question:   Reason for exam?     Answer:   Dyspnea     Order Specific Question:   Release to patient     Answer:   Immediate       Call for results of above testing.     After discussion with him CHADSVASC2 score of 0 , stopped Eliquis and was started Aspirin 81 mg daily  He is low risk (not no risk ) for cerebrovascular accident and cardio embolism  from paroxysmal atrial fibrillation     In absence of chest pains or any other suggestive symptoms stress testing is currently not warranted.  Discussed with patient.   Bring copies of labs and other test from primary care provider next visit     I support the patient's decision to take the Covid -19 vaccine   Had required complete course   No major issues   Now fully immunized    Recommend booster       Recommend evaluation for obstructive sleep apnea given snoring and paroxysmal atrial fibrillation  by primary provider   Monitor for any signs of bleeding including red or dark stools as well as easy bruisabilty. Fall precautions.     Advised again against drinking alcohol    Follow up with DAJA Ramirez  or myself              Return in about 1 year (around 12/15/2022).

## 2021-12-22 ENCOUNTER — HOSPITAL ENCOUNTER (OUTPATIENT)
Dept: CARDIOLOGY | Facility: HOSPITAL | Age: 66
Discharge: HOME | End: 2021-12-22
Admitting: INTERNAL MEDICINE

## 2021-12-22 VITALS
DIASTOLIC BLOOD PRESSURE: 84 MMHG | WEIGHT: 195.99 LBS | BODY MASS INDEX: 25.98 KG/M2 | SYSTOLIC BLOOD PRESSURE: 143 MMHG | HEIGHT: 73 IN

## 2021-12-22 DIAGNOSIS — R94.31 ABNORMAL ECG: ICD-10-CM

## 2021-12-22 DIAGNOSIS — I47.1 PAROXYSMAL SVT (SUPRAVENTRICULAR TACHYCARDIA) (HCC): ICD-10-CM

## 2021-12-22 DIAGNOSIS — I48.0 PAF (PAROXYSMAL ATRIAL FIBRILLATION) (HCC): ICD-10-CM

## 2021-12-22 PROCEDURE — 93306 TTE W/DOPPLER COMPLETE: CPT

## 2021-12-22 PROCEDURE — 93356 MYOCRD STRAIN IMG SPCKL TRCK: CPT | Performed by: INTERNAL MEDICINE

## 2021-12-22 PROCEDURE — 93306 TTE W/DOPPLER COMPLETE: CPT | Performed by: INTERNAL MEDICINE

## 2021-12-22 PROCEDURE — 93356 MYOCRD STRAIN IMG SPCKL TRCK: CPT

## 2021-12-24 LAB
BH CV ECHO MEAS - AO MAX PG (FULL): 0.9 MMHG
BH CV ECHO MEAS - AO MAX PG: 8.4 MMHG
BH CV ECHO MEAS - AO MEAN PG (FULL): 0 MMHG
BH CV ECHO MEAS - AO MEAN PG: 4 MMHG
BH CV ECHO MEAS - AO ROOT AREA (BSA CORRECTED): 1.8
BH CV ECHO MEAS - AO ROOT AREA: 11.9 CM^2
BH CV ECHO MEAS - AO ROOT DIAM: 3.9 CM
BH CV ECHO MEAS - AO V2 MAX: 145 CM/SEC
BH CV ECHO MEAS - AO V2 MEAN: 88.3 CM/SEC
BH CV ECHO MEAS - AO V2 VTI: 28.2 CM
BH CV ECHO MEAS - AVA(I,A): 4 CM^2
BH CV ECHO MEAS - AVA(I,D): 4 CM^2
BH CV ECHO MEAS - AVA(V,A): 3.9 CM^2
BH CV ECHO MEAS - AVA(V,D): 3.9 CM^2
BH CV ECHO MEAS - BSA(HAYCOCK): 2.1 M^2
BH CV ECHO MEAS - BSA: 2.1 M^2
BH CV ECHO MEAS - BZI_BMI: 25.9 KILOGRAMS/M^2
BH CV ECHO MEAS - BZI_METRIC_HEIGHT: 185.4 CM
BH CV ECHO MEAS - BZI_METRIC_WEIGHT: 88.9 KG
BH CV ECHO MEAS - EDV(CUBED): 112.7 ML
BH CV ECHO MEAS - EDV(MOD-SP4): 93 ML
BH CV ECHO MEAS - EDV(TEICH): 109.1 ML
BH CV ECHO MEAS - EF(CUBED): 90 %
BH CV ECHO MEAS - EF(MOD-SP4): 75.6 %
BH CV ECHO MEAS - EF(TEICH): 84.5 %
BH CV ECHO MEAS - ESV(CUBED): 11.2 ML
BH CV ECHO MEAS - ESV(MOD-SP4): 22.7 ML
BH CV ECHO MEAS - ESV(TEICH): 17 ML
BH CV ECHO MEAS - FS: 53.6 %
BH CV ECHO MEAS - IVS/LVPW: 0.98
BH CV ECHO MEAS - IVSD: 0.98 CM
BH CV ECHO MEAS - LA DIMENSION: 3.6 CM
BH CV ECHO MEAS - LA/AO: 0.92
BH CV ECHO MEAS - LAT PEAK E' VEL: 12.9 CM/SEC
BH CV ECHO MEAS - LV DIASTOLIC VOL/BSA (35-75): 43.6 ML/M^2
BH CV ECHO MEAS - LV MASS(C)D: 169.7 GRAMS
BH CV ECHO MEAS - LV MASS(C)DI: 79.6 GRAMS/M^2
BH CV ECHO MEAS - LV MAX PG: 7.5 MMHG
BH CV ECHO MEAS - LV MEAN PG: 4 MMHG
BH CV ECHO MEAS - LV SYSTOLIC VOL/BSA (12-30): 10.6 ML/M^2
BH CV ECHO MEAS - LV V1 MAX: 137 CM/SEC
BH CV ECHO MEAS - LV V1 MEAN: 87.8 CM/SEC
BH CV ECHO MEAS - LV V1 VTI: 27.1 CM
BH CV ECHO MEAS - LVIDD: 4.8 CM
BH CV ECHO MEAS - LVIDS: 2.2 CM
BH CV ECHO MEAS - LVLD AP4: 8.4 CM
BH CV ECHO MEAS - LVLS AP4: 7 CM
BH CV ECHO MEAS - LVOT AREA (M): 4.2 CM^2
BH CV ECHO MEAS - LVOT AREA: 4.2 CM^2
BH CV ECHO MEAS - LVOT DIAM: 2.3 CM
BH CV ECHO MEAS - LVPWD: 1 CM
BH CV ECHO MEAS - MED PEAK E' VEL: 7.51 CM/SEC
BH CV ECHO MEAS - MV A MAX VEL: 72.8 CM/SEC
BH CV ECHO MEAS - MV DEC TIME: 0.25 SEC
BH CV ECHO MEAS - MV E MAX VEL: 76.6 CM/SEC
BH CV ECHO MEAS - MV E/A: 1.1
BH CV ECHO MEAS - SI(AO): 157.9 ML/M^2
BH CV ECHO MEAS - SI(CUBED): 47.5 ML/M^2
BH CV ECHO MEAS - SI(LVOT): 52.8 ML/M^2
BH CV ECHO MEAS - SI(MOD-SP4): 33 ML/M^2
BH CV ECHO MEAS - SI(TEICH): 43.2 ML/M^2
BH CV ECHO MEAS - SV(AO): 336.9 ML
BH CV ECHO MEAS - SV(CUBED): 101.4 ML
BH CV ECHO MEAS - SV(LVOT): 112.6 ML
BH CV ECHO MEAS - SV(MOD-SP4): 70.3 ML
BH CV ECHO MEAS - SV(TEICH): 92.1 ML
BH CV ECHO MEAS - TR MAX VEL: 212 CM/SEC
BH CV ECHO MEASUREMENTS AVERAGE E/E' RATIO: 7.51
LEFT ATRIUM VOLUME INDEX: 25.4 ML/M2
LEFT ATRIUM VOLUME: 54.1 CM3
MAXIMAL PREDICTED HEART RATE: 155 BPM
STRESS TARGET HR: 132 BPM

## 2022-12-16 ENCOUNTER — OFFICE VISIT (OUTPATIENT)
Dept: CARDIOLOGY | Facility: CLINIC | Age: 67
End: 2022-12-16

## 2022-12-16 VITALS
HEART RATE: 67 BPM | SYSTOLIC BLOOD PRESSURE: 144 MMHG | HEIGHT: 73 IN | DIASTOLIC BLOOD PRESSURE: 90 MMHG | WEIGHT: 203 LBS | OXYGEN SATURATION: 97 % | BODY MASS INDEX: 26.9 KG/M2

## 2022-12-16 DIAGNOSIS — I48.0 PAF (PAROXYSMAL ATRIAL FIBRILLATION): ICD-10-CM

## 2022-12-16 DIAGNOSIS — R06.83 SNORING: ICD-10-CM

## 2022-12-16 DIAGNOSIS — I47.1 PAROXYSMAL SVT (SUPRAVENTRICULAR TACHYCARDIA): Primary | ICD-10-CM

## 2022-12-16 DIAGNOSIS — I51.7 MILD LEFT ATRIAL ENLARGEMENT: ICD-10-CM

## 2022-12-16 PROCEDURE — 93000 ELECTROCARDIOGRAM COMPLETE: CPT | Performed by: INTERNAL MEDICINE

## 2022-12-16 PROCEDURE — 99214 OFFICE O/P EST MOD 30 MIN: CPT | Performed by: INTERNAL MEDICINE

## 2022-12-16 RX ORDER — ASPIRIN 81 MG/1
81 TABLET ORAL DAILY
Qty: 100 TABLET | Refills: 3 | Status: SHIPPED | OUTPATIENT
Start: 2022-12-16

## 2022-12-16 NOTE — PROGRESS NOTES
Geoff Beal  [id number]  1955  67 y.o.  male    Referring Provider: Rah Kearney DO    Reason for  Visit:     Here for routine follow up   Prior visit for short term office follow up after recent encounter   Paroxysmal atrial fibrillation now sinus rhythm  atrial fibrillation only during hospitalization  Prior admission and discharge for acute respiratory failure  Prior Zio patch some SVT but no paroxysmal atrial fibrillation   Cardiac workup test results as below: echo     Subjective    Overall feels the same   No new events or complaints since last visit   Overall the patient feels no major change from baseline symptoms   Similar symptoms as during last visit      Excellent effort tolerance with no cardiovascular limitations and at the patient's baseline   Can bicycle up to 17 miles with no issues     No new events or complaints since last visit   Overall feeling well   No chest pain or shortness of breath     No palpitations  No significant pedal edema    No presyncope or syncope  Compliant with medications    Tolerating current medications well with no untoward side effects   Compliant with prescribed medication regimen. Tries to adhere to cardiac diet.      BP well controlled at home 120S/70s mm Hg     No bleeding, excessive bruising, gait instability or fall risks     Has moderate snoring    No recent labs     No bleeding, excessive bruising, gait instability or fall risks        History of present illness:  Geoff Beal is a 67 y.o. yo male with  paroxysmal atrial fibrillation  who presents today for   Chief Complaint   Patient presents with   • Atrial Fibrillation     1 YR FU-HX OF AFIB   .    History  Past Medical History:   Diagnosis Date   • Sinus congestion    ,   History reviewed. No pertinent surgical history.,   History reviewed. No pertinent family history.,   Social History     Tobacco Use   • Smoking status: Former     Years: 4.00     Types: Cigarettes   • Smokeless tobacco:  "Never   Vaping Use   • Vaping Use: Never used   Substance Use Topics   • Alcohol use: Yes     Comment: occasional   • Drug use: No   ,     Medications  Current Outpatient Medications   Medication Sig Dispense Refill   • Aspirin Low Dose 81 MG EC tablet TAKE 1 TABLET BY MOUTH EVERY  tablet 3   • hydrocortisone (ANUSOL-HC) 2.5 % rectal cream Insert 1 application into the rectum 2 (Two) Times a Day.       No current facility-administered medications for this visit.       Allergies:  Penicillins and Bactrim [sulfamethoxazole-trimethoprim]    Review of Systems  Review of Systems   Constitutional: Negative for chills, fever, malaise/fatigue and night sweats.   HENT: Negative.    Eyes: Negative.    Cardiovascular: Negative for chest pain, claudication, cyanosis, dyspnea on exertion, irregular heartbeat, leg swelling, near-syncope, orthopnea, palpitations, paroxysmal nocturnal dyspnea and syncope.   Respiratory: Negative.  Negative for cough.    Endocrine: Negative.    Hematologic/Lymphatic: Negative.    Skin: Negative.    Musculoskeletal: Negative for arthritis, back pain and joint pain.   Gastrointestinal: Negative for anorexia.   Genitourinary: Negative.    Neurological: Negative.    Psychiatric/Behavioral: Negative.    Same review of system and physical exam as last visit      Objective     Physical Exam:  /90 (BP Location: Left arm, Patient Position: Sitting, Cuff Size: Large Adult)   Pulse 67   Ht 185.4 cm (72.99\")   Wt 92.1 kg (203 lb)   SpO2 97%   BMI 26.79 kg/m²     Physical Exam   Constitutional: He appears well-developed.   HENT:   Head: Normocephalic.   Neck: Normal carotid pulses and no JVD present. No tracheal tenderness present. Carotid bruit is not present. No tracheal deviation present.   Cardiovascular: Regular rhythm, normal heart sounds and normal pulses.   Pulmonary/Chest: Effort normal. No stridor.   Abdominal: Soft. He exhibits no distension. There is no abdominal tenderness. "   Neurological: He is alert. No cranial nerve deficit or sensory deficit.   Skin: Skin is warm.   Psychiatric: His speech is normal and behavior is normal.       Results Review:      Geoff Beal   Holter Monitor 72Hr-Day (0296T/8T)   Order# [id number]   Reading physician: Patrick Meek MD Ordering physician: Patrick Meek MD Study date: 20   Patient Information     Patient Name  Geoff Beal MRN  [id number] Sex  Male  (Age)  1955 (64 y.o.)   Interpretation Summary        · Heart rates between 49 to 187 bpm, average 75 bpm     · Entire report was reviewed.  Monitoring in days: ~14   · The predominant rhythm noted during the testing period was sinus rhythm.     · Rare supraventricular ectopics with an APC burden of: < 1%  · 12 runs of supraventricular tachycardia longest 1 minute 6 seconds at a maximum rate of 187 bpm         · Rare premature ventricular contractions with a PVC burden of: < 1%  · No significant  ventricular tachy or belen arrhythmia.     · No correlated arrhythmia  · No significant pauses           Conclusion: Baseline rhythm is sinus.  No significant ectopy   12 runs of supraventricular tachycardia longest 1 minute and 6 seconds at a maximum rate of 187         Results for orders placed during the hospital encounter of 21    Adult Transthoracic Echo Complete w/ Color, Spectral and Contrast if necessary per protocol    Interpretation Summary  · Left ventricular ejection fraction appears to be 66 - 70%. Left ventricular systolic function is normal.  · Left ventricular diastolic function was normal.  · Abnormal global longitudinal LV strain (GLS) = -11%.  · Estimated right ventricular systolic pressure from tricuspid regurgitation is normal (<35 mmHg).     ____________________________________________________________________________________________________________________________________________  Health maintenance and recommendations  Similar recommendations as last visit      Offered to give patient  a copy      Questions were encouraged, asked and answered to the patient's  understanding and satisfaction. Questions if any regarding current medications and side effects, need for refills and importance of compliance to medications stressed.    Reviewed available prior notes, consults, prior visits, laboratory findings, radiology and cardiology relevant reports. Updated chart as applicable. I have reviewed the patient's medical history in detail and updated the computerized patient record as relevant.      Updated patient regarding any new or relevant abnormalities on review of records or any new findings on physical exam. Mentioned to patient about purpose of visit and desirable health short and long term goals and objectives.    Primary to monitor CBC CMP Lipid panel and TSH as applicable    ___________________________________________________________________________________________________________________________________________           ECG 12 Lead    Date/Time: 12/16/2022 10:10 AM  Performed by: Patrick Meek MD  Authorized by: Patrick Meek MD   Comparison: compared with previous ECG from 12/15/2021  Similar to previous ECG  Rhythm: sinus rhythm  Rate: normal  Conduction: conduction normal  ST Segments: ST segments normal  T Waves: T waves normal  QRS axis: normal  Other: no other findings    Clinical impression: normal ECG            Assessment & Plan   Diagnoses and all orders for this visit:    1. Paroxysmal SVT (supraventricular tachycardia) (HCC) (Primary)    2. PAF (paroxysmal atrial fibrillation) (HCC)    3. Mild left atrial enlargement    4. Snoring    Other orders  -     ECG 12 Lead         Plan    Patient expressed understanding  Encouraged and answered all questions   Discussed with the patient and all questioned fully answered. He will call me if any problems arise.   Discussed results of prior testing with patient : echo   as well electrocardiogram from today        Overall doing well no new cardiovascular symptoms and therefore no additional cardiac testing is required prior to next visit  If any interim issues arise will call me for further evaluation.     After discussion with him CHADSVASC2 score of 0 , stopped Eliquis prior visit and was started Aspirin 81 mg daily  Requested Prescriptions     Signed Prescriptions Disp Refills   • aspirin (Aspirin Low Dose) 81 MG EC tablet 100 tablet 3     Sig: Take 1 tablet by mouth Daily.      He is low risk (not no risk ) for cerebrovascular accident and cardio embolism  from paroxysmal atrial fibrillation   Monitor for any signs of bleeding including red or dark stools as well as easy bruisabilty. Fall precautions.     In absence of chest pains or any other suggestive symptoms stress testing is currently not warranted.  Discussed with patient.   Bring copies of labs and other test from primary care provider next visit     I support the patient's decision to take the Covid -19 vaccine   Had required complete course   No major issues   Now fully immunized    Recommend booster       Recommend evaluation for obstructive sleep apnea given snoring and paroxysmal atrial fibrillation by primary provider     Advised again against drinking alcohol  Follow up with DAJA Ramirez        Return in about 1 year (around 12/16/2023).

## 2023-12-11 NOTE — PROGRESS NOTES
Subjective:     Encounter Date:12/12/2023      Patient ID: Geoff Beal is a 68 y.o. male with paroxysmal atrial fibrillation, paroxysmal SVT.    Chief Complaint: routine follow up  History of Present Illness  Patient presents today for management of paroxysmal atrial fibrillation. Today patient reports that he has been doing well. He denies any chest pain. He denies any heart racing or palpitations. He denies any dyspnea on exertion. He denies any leg swelling, orthopnea or PND. He denies any dizziness or near syncope. He reports that he monitors his BP at home and it has remained controlled running 120-130/60-70. He reports that he has riding a bike 15-20 miles three times a week. He also is an avid golf player. Patient follows with Dr Kearney as PCP.     The following portions of the patient's history were reviewed and updated as appropriate: allergies, current medications, past family history, past medical history, past social history, past surgical history and problem list.    Allergies   Allergen Reactions    Penicillins Unknown - Low Severity     PT does not recall.    Bactrim [Sulfamethoxazole-Trimethoprim] Dizziness       Current Outpatient Medications:     aspirin (Aspirin Low Dose) 81 MG EC tablet, Take 1 tablet by mouth Daily., Disp: 100 tablet, Rfl: 3  Past Medical History:   Diagnosis Date    Sinus congestion      Social History     Socioeconomic History    Marital status: Single   Tobacco Use    Smoking status: Former     Packs/day: 0.50     Years: 4.00     Additional pack years: 0.00     Total pack years: 2.00     Types: Cigarettes    Smokeless tobacco: Never   Vaping Use    Vaping Use: Never used   Substance and Sexual Activity    Alcohol use: Yes     Comment: occasional    Drug use: No    Sexual activity: Defer       Review of Systems   Constitutional: Negative for malaise/fatigue.   HENT:  Negative for nosebleeds.    Cardiovascular:  Negative for chest pain, dyspnea on exertion, irregular  "heartbeat, leg swelling, near-syncope, orthopnea, palpitations, paroxysmal nocturnal dyspnea and syncope.   Respiratory:  Negative for shortness of breath.    Hematologic/Lymphatic: Does not bruise/bleed easily.   Genitourinary:  Negative for hematuria.   Neurological:  Negative for dizziness and weakness.   All other systems reviewed and are negative.         Objective:     Vitals reviewed.   Constitutional:       General: Not in acute distress.     Appearance: Normal appearance. Well-developed.   Eyes:      Pupils: Pupils are equal, round, and reactive to light.   HENT:      Head: Normocephalic and atraumatic.      Nose: Nose normal.   Neck:      Vascular: No carotid bruit.   Pulmonary:      Effort: Pulmonary effort is normal. No respiratory distress.      Breath sounds: Normal breath sounds. No wheezing. No rales.   Cardiovascular:      Normal rate. Regular rhythm.      Murmurs: There is no murmur.   Edema:     Peripheral edema absent.   Abdominal:      General: There is no distension.      Palpations: Abdomen is soft.   Musculoskeletal: Normal range of motion.      Cervical back: Normal range of motion and neck supple. Skin:     General: Skin is warm.      Findings: No erythema or rash.   Neurological:      General: No focal deficit present.      Mental Status: Alert and oriented to person, place, and time.   Psychiatric:         Attention and Perception: Attention normal.         Mood and Affect: Mood normal.         Speech: Speech normal.         Behavior: Behavior normal.         Thought Content: Thought content normal.         Judgment: Judgment normal.         /72   Pulse 81   Ht 185.4 cm (72.99\")   Wt 93.4 kg (206 lb)   SpO2 99%   BMI 27.18 kg/m²       ECG 12 Lead    Date/Time: 12/12/2023 10:02 AM  Performed by: Stoney Weiss APRN    Authorized by: Stoney Weiss APRN  Comparison: compared with previous ECG from 12/16/2022  Similar to previous ECG  Rhythm: sinus rhythm  Rate: normal  BPM: " 73          Lab Review:     Results for orders placed during the hospital encounter of 12/22/21    Adult Transthoracic Echo Complete w/ Color, Spectral and Contrast if necessary per protocol    Interpretation Summary  · Left ventricular ejection fraction appears to be 66 - 70%. Left ventricular systolic function is normal.  · Left ventricular diastolic function was normal.  · Abnormal global longitudinal LV strain (GLS) = -11%.  · Estimated right ventricular systolic pressure from tricuspid regurgitation is normal (<35 mmHg).    I have personally reviewed echo and past office notes prior to patients visit  Assessment:          Diagnosis Plan   1. PAF (paroxysmal atrial fibrillation)        2. Paroxysmal SVT (supraventricular tachycardia)               Plan:       PAF: Episode occurred during acute illness/sepsis. Patient has had no recurrence. EKG today shows NSR rate of 73. [id number] score of 1. Patient continues on aspirin.     2. Paroxysmal SVT: noted on holter monitor 5/4/2020. Patient remains stable. Echo showed normal structure and function of heart in 12/22/2021.    I spent 36 minutes caring for Geoff on this date of service. This time includes time spent by me in the following activities:preparing for the visit, reviewing tests, obtaining and/or reviewing a separately obtained history, performing a medically appropriate examination and/or evaluation , counseling and educating the patient/family/caregiver, and documenting information in the medical record     I spent 2 minutes on the separately reported service of EKG. This time is not included in the time used to support the E/M service also reported today.    Patient is to follow up as needed

## 2023-12-12 ENCOUNTER — OFFICE VISIT (OUTPATIENT)
Dept: CARDIOLOGY | Facility: CLINIC | Age: 68
End: 2023-12-12
Payer: MEDICARE

## 2023-12-12 VITALS
BODY MASS INDEX: 27.3 KG/M2 | HEIGHT: 73 IN | HEART RATE: 81 BPM | WEIGHT: 206 LBS | DIASTOLIC BLOOD PRESSURE: 72 MMHG | SYSTOLIC BLOOD PRESSURE: 138 MMHG | OXYGEN SATURATION: 99 %

## 2023-12-12 DIAGNOSIS — I48.0 PAF (PAROXYSMAL ATRIAL FIBRILLATION): Primary | ICD-10-CM

## 2023-12-12 DIAGNOSIS — I47.10 PAROXYSMAL SVT (SUPRAVENTRICULAR TACHYCARDIA): ICD-10-CM

## 2023-12-12 PROCEDURE — 1159F MED LIST DOCD IN RCRD: CPT | Performed by: NURSE PRACTITIONER

## 2023-12-12 PROCEDURE — 93000 ELECTROCARDIOGRAM COMPLETE: CPT | Performed by: NURSE PRACTITIONER

## 2023-12-12 PROCEDURE — 1160F RVW MEDS BY RX/DR IN RCRD: CPT | Performed by: NURSE PRACTITIONER

## 2023-12-12 PROCEDURE — 99214 OFFICE O/P EST MOD 30 MIN: CPT | Performed by: NURSE PRACTITIONER

## 2023-12-12 RX ORDER — ASPIRIN 81 MG/1
81 TABLET ORAL DAILY
Qty: 100 TABLET | Refills: 3 | Status: SHIPPED | OUTPATIENT
Start: 2023-12-12

## 2025-01-02 NOTE — PROGRESS NOTES
"Subjective    Mr. Beal is 69 y.o. male    Chief Complaint: Blood in urine    History of Present Illness    69-year-old male new patient referred by PCP due to blood in urine.  Onset 3 months ago occurring intermittently.  Patient reports blood noted at the end of urination.  Denies flank pain.  Denies history of kidney stones.  Denies unexplained weight loss.  Patient does have a family history of bladder Ca in his father diagnosed late 60's early 70's.  Smoked at age 13-16. Quit at age 17.  Under went renal ultrasound imaging within the last month no abnormality noted on report.    The following portions of the patient's history were reviewed and updated as appropriate: allergies, current medications, past family history, past medical history, past social history, past surgical history and problem list.    Review of Systems   Constitutional:  Negative for chills, fatigue and fever.   Gastrointestinal:  Negative for nausea and vomiting.   Genitourinary:  Positive for hematuria. Negative for decreased urine volume, difficulty urinating, dysuria, flank pain, frequency, testicular pain and urgency.         Current Outpatient Medications:     aspirin 81 MG EC tablet, Take 1 tablet by mouth Daily., Disp: 100 tablet, Rfl: 3    Past Medical History:   Diagnosis Date    Sinus congestion        History reviewed. No pertinent surgical history.    Social History     Socioeconomic History    Marital status: Single   Tobacco Use    Smoking status: Former     Current packs/day: 0.50     Average packs/day: 0.5 packs/day for 4.0 years (2.0 ttl pk-yrs)     Types: Cigarettes     Passive exposure: Past    Smokeless tobacco: Never   Vaping Use    Vaping status: Never Used   Substance and Sexual Activity    Alcohol use: Yes     Comment: occasional    Drug use: No    Sexual activity: Defer       History reviewed. No pertinent family history.    Objective    Temp 97.3 °F (36.3 °C)   Ht 185.4 cm (73\")   Wt 96.2 kg (212 lb)   BMI " 27.97 kg/m²     Physical Exam  Constitutional:       Appearance: Normal appearance.   Abdominal:      Tenderness: There is no right CVA tenderness or left CVA tenderness.   Skin:     General: Skin is warm and dry.   Neurological:      Mental Status: He is alert and oriented to person, place, and time.   Psychiatric:         Mood and Affect: Mood normal.         Behavior: Behavior normal.             Results for orders placed or performed in visit on 01/14/25   POC Urinalysis Dipstick, Multipro    Collection Time: 01/14/25  9:36 AM    Specimen: Urine   Result Value Ref Range    Color Yellow Yellow, Straw, Dark Yellow, Jolly    Clarity, UA Clear Clear    Glucose, UA Negative Negative mg/dL    Bilirubin Negative Negative    Ketones, UA Negative Negative    Specific Gravity  1.010 1.005 - 1.030    Blood, UA Negative Negative    pH, Urine 6.0 5.0 - 8.0    Protein, POC Negative Negative mg/dL    Urobilinogen, UA 0.2 E.U./dL Normal, 0.2 E.U./dL    Nitrite, UA Negative Negative    Leukocytes Negative Negative     Assessment and Plan    Diagnoses and all orders for this visit:    1. Gross hematuria (Primary)  -     POC Urinalysis Dipstick, Multipro  -     CT Abdomen Pelvis With & Without Contrast; Future      No blood noted on urinalysis today.  Microscopic evaluation no RBCs.  Given the fact patient experiencing intermittent gross hematuria recommend full hematuria workup would like for patient to undergo CT urogram as well as cystoscopy given patient's family history of bladder CA and patient age.  Denies any bothersome LUTS      Renal ultrasound report available under media which was reviewed.  We are trying to obtain renal ultrasound images we have contacted Roger for the release

## 2025-01-14 ENCOUNTER — OFFICE VISIT (OUTPATIENT)
Dept: UROLOGY | Facility: CLINIC | Age: 70
End: 2025-01-14
Payer: MEDICARE

## 2025-01-14 VITALS — BODY MASS INDEX: 28.1 KG/M2 | HEIGHT: 73 IN | TEMPERATURE: 97.3 F | WEIGHT: 212 LBS

## 2025-01-14 DIAGNOSIS — R31.0 GROSS HEMATURIA: Primary | ICD-10-CM

## 2025-01-14 LAB
BILIRUB BLD-MCNC: NEGATIVE MG/DL
CLARITY, POC: CLEAR
COLOR UR: YELLOW
GLUCOSE UR STRIP-MCNC: NEGATIVE MG/DL
KETONES UR QL: NEGATIVE
LEUKOCYTE EST, POC: NEGATIVE
NITRITE UR-MCNC: NEGATIVE MG/ML
PH UR: 6 [PH] (ref 5–8)
PROT UR STRIP-MCNC: NEGATIVE MG/DL
RBC # UR STRIP: NEGATIVE /UL
SP GR UR: 1.01 (ref 1–1.03)
UROBILINOGEN UR QL: NORMAL

## 2025-01-27 ENCOUNTER — HOSPITAL ENCOUNTER (OUTPATIENT)
Dept: CT IMAGING | Facility: HOSPITAL | Age: 70
Discharge: HOME | End: 2025-01-27
Payer: MEDICARE

## 2025-01-27 DIAGNOSIS — R31.0 GROSS HEMATURIA: ICD-10-CM

## 2025-01-27 PROCEDURE — 25510000001 IOPAMIDOL PER 1 ML

## 2025-01-27 PROCEDURE — 74178 CT ABD&PLV WO CNTR FLWD CNTR: CPT

## 2025-01-27 RX ORDER — IOPAMIDOL 755 MG/ML
100 INJECTION, SOLUTION INTRAVASCULAR
Status: COMPLETED | OUTPATIENT
Start: 2025-01-27 | End: 2025-01-27

## 2025-01-27 RX ADMIN — IOPAMIDOL 100 ML: 755 INJECTION, SOLUTION INTRAVENOUS at 09:18

## 2025-02-14 ENCOUNTER — PROCEDURE VISIT (OUTPATIENT)
Dept: UROLOGY | Facility: CLINIC | Age: 70
End: 2025-02-14
Payer: MEDICARE

## 2025-02-14 DIAGNOSIS — R31.0 GROSS HEMATURIA: Primary | ICD-10-CM

## 2025-02-14 NOTE — PROGRESS NOTES
Pre- operative diagnosis:  Hematuria    Post operative diagnosis:  BPH    Procedure:  The patient was prepped and draped in a normal sterile fashion.  The urethra was anesthetized with 2% lidocaine jelly.  A flexible cystoscope was introduced per urethra.      Urethra:  Normal    Bladder:  There is no evidence of a stone, foreign body or mass within the bladder.  The bladder is minimally trabeculated.  The bladder neck is without contracture.    Ureteral orifices:  Normal position bilaterally    Prostate:  lateral lobe hypertrophy    Patient tolerated the procedure well    Complications: none    Blood loss: minimal    Follow up:    Routine follow up    CT negative.  I do not see any evidence of bladder tumor.  The blood is likely from the prostate.  We discussed finasteride.  Patient wants to hold off on that.